# Patient Record
Sex: MALE | Race: BLACK OR AFRICAN AMERICAN | Employment: UNEMPLOYED | URBAN - METROPOLITAN AREA
[De-identification: names, ages, dates, MRNs, and addresses within clinical notes are randomized per-mention and may not be internally consistent; named-entity substitution may affect disease eponyms.]

---

## 2020-06-23 ENCOUNTER — HOSPITAL ENCOUNTER (EMERGENCY)
Age: 45
Discharge: HOME | End: 2020-06-23
Payer: MEDICAID

## 2020-06-23 ENCOUNTER — APPOINTMENT (OUTPATIENT)
Dept: GENERAL RADIOLOGY | Age: 45
End: 2020-06-23
Payer: MEDICAID

## 2020-06-23 VITALS
HEART RATE: 81 BPM | WEIGHT: 182.54 LBS | HEIGHT: 71 IN | RESPIRATION RATE: 17 BRPM | OXYGEN SATURATION: 100 % | DIASTOLIC BLOOD PRESSURE: 89 MMHG | BODY MASS INDEX: 25.56 KG/M2 | SYSTOLIC BLOOD PRESSURE: 142 MMHG | TEMPERATURE: 97.6 F

## 2020-06-23 PROCEDURE — 6370000000 HC RX 637 (ALT 250 FOR IP): Performed by: NURSE PRACTITIONER

## 2020-06-23 PROCEDURE — 72100 X-RAY EXAM L-S SPINE 2/3 VWS: CPT

## 2020-06-23 PROCEDURE — 99283 EMERGENCY DEPT VISIT LOW MDM: CPT

## 2020-06-23 RX ORDER — METHOCARBAMOL 750 MG/1
750 TABLET, FILM COATED ORAL 3 TIMES DAILY
Qty: 15 TABLET | Refills: 0 | Status: SHIPPED | OUTPATIENT
Start: 2020-06-23 | End: 2020-06-28

## 2020-06-23 RX ORDER — METHOCARBAMOL 750 MG/1
750 TABLET, FILM COATED ORAL ONCE
Status: COMPLETED | OUTPATIENT
Start: 2020-06-23 | End: 2020-06-23

## 2020-06-23 RX ORDER — NAPROXEN 250 MG/1
500 TABLET ORAL ONCE
Status: DISCONTINUED | OUTPATIENT
Start: 2020-06-23 | End: 2020-06-23 | Stop reason: HOSPADM

## 2020-06-23 RX ADMIN — METHOCARBAMOL 750 MG: 750 TABLET ORAL at 13:15

## 2020-06-23 ASSESSMENT — PAIN SCALES - GENERAL
PAINLEVEL_OUTOF10: 10
PAINLEVEL_OUTOF10: 8
PAINLEVEL_OUTOF10: 10
PAINLEVEL_OUTOF10: 10

## 2020-06-23 ASSESSMENT — PAIN DESCRIPTION - PROGRESSION
CLINICAL_PROGRESSION: NOT CHANGED
CLINICAL_PROGRESSION: NOT CHANGED

## 2020-06-23 ASSESSMENT — PAIN DESCRIPTION - ONSET: ONSET: ON-GOING

## 2020-06-23 ASSESSMENT — PAIN - FUNCTIONAL ASSESSMENT: PAIN_FUNCTIONAL_ASSESSMENT: PREVENTS OR INTERFERES SOME ACTIVE ACTIVITIES AND ADLS

## 2020-06-23 ASSESSMENT — PAIN DESCRIPTION - PAIN TYPE: TYPE: ACUTE PAIN;CHRONIC PAIN

## 2020-06-23 ASSESSMENT — PAIN DESCRIPTION - FREQUENCY: FREQUENCY: CONTINUOUS

## 2020-06-23 ASSESSMENT — PAIN DESCRIPTION - ORIENTATION: ORIENTATION: RIGHT;LEFT

## 2020-06-23 ASSESSMENT — PAIN DESCRIPTION - LOCATION: LOCATION: BACK;SHOULDER
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
## 2020-07-26 ENCOUNTER — HOSPITAL ENCOUNTER (EMERGENCY)
Age: 45
Discharge: HOME | End: 2020-07-26
Attending: EMERGENCY MEDICINE
Payer: MEDICAID

## 2020-07-26 ENCOUNTER — APPOINTMENT (OUTPATIENT)
Dept: GENERAL RADIOLOGY | Age: 45
End: 2020-07-26
Payer: MEDICAID

## 2020-07-26 VITALS
DIASTOLIC BLOOD PRESSURE: 70 MMHG | HEIGHT: 71 IN | SYSTOLIC BLOOD PRESSURE: 149 MMHG | TEMPERATURE: 98.9 F | RESPIRATION RATE: 16 BRPM | BODY MASS INDEX: 26.23 KG/M2 | OXYGEN SATURATION: 100 % | WEIGHT: 187.39 LBS | HEART RATE: 90 BPM

## 2020-07-26 LAB
A/G RATIO: 1.2 (ref 1.1–2.2)
ALBUMIN SERPL-MCNC: 3.5 G/DL (ref 3.4–5)
ALP BLD-CCNC: 71 U/L (ref 40–129)
ALT SERPL-CCNC: 18 U/L (ref 10–40)
ANION GAP SERPL CALCULATED.3IONS-SCNC: 10 MMOL/L (ref 3–16)
ANISOCYTOSIS: ABNORMAL
AST SERPL-CCNC: 16 U/L (ref 15–37)
BANDED NEUTROPHILS RELATIVE PERCENT: 1 % (ref 0–7)
BASOPHILS ABSOLUTE: 0.1 K/UL (ref 0–0.2)
BASOPHILS RELATIVE PERCENT: 1 %
BILIRUB SERPL-MCNC: <0.2 MG/DL (ref 0–1)
BUN BLDV-MCNC: 9 MG/DL (ref 7–20)
CALCIUM SERPL-MCNC: 8.6 MG/DL (ref 8.3–10.6)
CHLORIDE BLD-SCNC: 105 MMOL/L (ref 99–110)
CO2: 23 MMOL/L (ref 21–32)
CREAT SERPL-MCNC: 0.9 MG/DL (ref 0.9–1.3)
EOSINOPHILS ABSOLUTE: 0.1 K/UL (ref 0–0.6)
EOSINOPHILS RELATIVE PERCENT: 1 %
GFR AFRICAN AMERICAN: >60
GFR NON-AFRICAN AMERICAN: >60
GLOBULIN: 3 G/DL
GLUCOSE BLD-MCNC: 91 MG/DL (ref 70–99)
HCT VFR BLD CALC: 35 % (ref 40.5–52.5)
HEMATOLOGY PATH CONSULT: YES
HEMOGLOBIN: 11 G/DL (ref 13.5–17.5)
HYPOCHROMIA: ABNORMAL
LYMPHOCYTES ABSOLUTE: 1.5 K/UL (ref 1–5.1)
LYMPHOCYTES RELATIVE PERCENT: 26 %
MACROCYTES: ABNORMAL
MCH RBC QN AUTO: 20.5 PG (ref 26–34)
MCHC RBC AUTO-ENTMCNC: 31.4 G/DL (ref 31–36)
MCV RBC AUTO: 65.5 FL (ref 80–100)
MICROCYTES: ABNORMAL
MONOCYTES ABSOLUTE: 0.4 K/UL (ref 0–1.3)
MONOCYTES RELATIVE PERCENT: 7 %
NEUTROPHILS ABSOLUTE: 3.7 K/UL (ref 1.7–7.7)
NEUTROPHILS RELATIVE PERCENT: 64 %
PDW BLD-RTO: 18.6 % (ref 12.4–15.4)
PLATELET # BLD: 330 K/UL (ref 135–450)
PLATELET SLIDE REVIEW: ADEQUATE
PMV BLD AUTO: 8.4 FL (ref 5–10.5)
POLYCHROMASIA: ABNORMAL
POTASSIUM SERPL-SCNC: 4.1 MMOL/L (ref 3.5–5.1)
RBC # BLD: 5.35 M/UL (ref 4.2–5.9)
SLIDE REVIEW: ABNORMAL
SODIUM BLD-SCNC: 138 MMOL/L (ref 136–145)
TOTAL PROTEIN: 6.5 G/DL (ref 6.4–8.2)
WBC # BLD: 5.7 K/UL (ref 4–11)

## 2020-07-26 PROCEDURE — 72100 X-RAY EXAM L-S SPINE 2/3 VWS: CPT

## 2020-07-26 PROCEDURE — 6370000000 HC RX 637 (ALT 250 FOR IP): Performed by: EMERGENCY MEDICINE

## 2020-07-26 PROCEDURE — 99283 EMERGENCY DEPT VISIT LOW MDM: CPT

## 2020-07-26 PROCEDURE — 80053 COMPREHEN METABOLIC PANEL: CPT

## 2020-07-26 PROCEDURE — 72040 X-RAY EXAM NECK SPINE 2-3 VW: CPT

## 2020-07-26 PROCEDURE — 85025 COMPLETE CBC W/AUTO DIFF WBC: CPT

## 2020-07-26 RX ORDER — CYCLOBENZAPRINE HCL 10 MG
10 TABLET ORAL ONCE
Status: COMPLETED | OUTPATIENT
Start: 2020-07-26 | End: 2020-07-26

## 2020-07-26 RX ORDER — CYCLOBENZAPRINE HCL 10 MG
10 TABLET ORAL 3 TIMES DAILY PRN
Qty: 20 TABLET | Refills: 0 | Status: SHIPPED | OUTPATIENT
Start: 2020-07-26 | End: 2020-08-02

## 2020-07-26 RX ADMIN — CYCLOBENZAPRINE 10 MG: 10 TABLET, FILM COATED ORAL at 16:30

## 2020-07-26 ASSESSMENT — PAIN DESCRIPTION - PROGRESSION: CLINICAL_PROGRESSION: GRADUALLY WORSENING

## 2020-07-26 ASSESSMENT — PAIN DESCRIPTION - FREQUENCY: FREQUENCY: CONTINUOUS

## 2020-07-26 ASSESSMENT — PAIN DESCRIPTION - PAIN TYPE: TYPE: ACUTE PAIN;CHRONIC PAIN

## 2020-07-26 ASSESSMENT — PAIN DESCRIPTION - LOCATION: LOCATION: NECK;HAND

## 2020-07-26 ASSESSMENT — PAIN SCALES - GENERAL
PAINLEVEL_OUTOF10: 0
PAINLEVEL_OUTOF10: 9

## 2020-07-26 ASSESSMENT — PAIN - FUNCTIONAL ASSESSMENT: PAIN_FUNCTIONAL_ASSESSMENT: PREVENTS OR INTERFERES SOME ACTIVE ACTIVITIES AND ADLS

## 2020-07-26 ASSESSMENT — PAIN DESCRIPTION - ORIENTATION: ORIENTATION: RIGHT;LEFT

## 2020-07-26 ASSESSMENT — PAIN DESCRIPTION - DESCRIPTORS: DESCRIPTORS: ACHING;THROBBING

## 2020-07-26 NOTE — ED PROVIDER NOTES
Triage Chief Complaint:   Neck Pain (patient states neck surg 4-5 years ago.  having problems with recurrent right shoulder, bilateral hand and right leg pain. states just moved to the area, no PMD.  to ED for eval. )    MAU:  Janelle Lino is a 53 y.o. male that presents with request for referral to family doctor stating that he moved to the area and \"I need a doctor\". He has a variety of complaints all of which are chronic including neck pain which has been bothering him since he had surgery on it \"4 to 5 years ago\", along with bilateral chronic right leg pain and hand pain as well as low back pain. No change in urine no change in bowel no saddle anesthesia no recent trauma    ROS:  At least 12 systems reviewed and otherwise acutely negative except as in the [address] No past medical history on file. No past surgical history on file. No family history on file.   Social History     Socioeconomic History    Marital status: Single     Spouse name: Not on file    Number of children: Not on file    Years of education: Not on file    Highest education level: Not on file   Occupational History    Not on file   Social Needs    Financial resource strain: Not on file    Food insecurity     Worry: Not on file     Inability: Not on file    Transportation needs     Medical: Not on file     Non-medical: Not on file   Tobacco Use    Smoking status: Not on file   Substance and Sexual Activity    Alcohol use: Not on file    Drug use: Not on file    Sexual activity: Not on file   Lifestyle    Physical activity     Days per week: Not on file     Minutes per session: Not on file    Stress: Not on file   Relationships    Social connections     Talks on phone: Not on file     Gets together: Not on file     Attends Shinto service: Not on file     Active member of club or organization: Not on file     Attends meetings of clubs or organizations: Not on file     Relationship status: Not on file    Intimate partner violence     Fear of current or ex partner: Not on file     Emotionally abused: Not on file     Physically abused: Not on file     Forced sexual activity: Not on file   Other Topics Concern    Not on file   Social History Narrative    Not on file     Current Facility-Administered Medications   Medication Dose Route Frequency Provider Last Rate Last Dose    cyclobenzaprine (FLEXERIL) tablet 10 mg  10 mg Oral Once Maral Kruse MD         Current Outpatient Medications   Medication Sig Dispense Refill    diclofenac (VOLTAREN) 50 MG EC tablet Take 1 tablet by mouth 2 times daily 30 tablet 0     No Known Allergies    [unfilled]    Nursing Notes Reviewed    Physical Exam:  Vitals:    07/26/20 1441   BP: (!) 149/70   Pulse: 90   Resp: 16   Temp: 98.9 °F (37.2 °C)   SpO2: 100%       Constitutional:  Well developed, well nourished, no acute distress  HENT:  Atraumatic,  Moist mucus membranes  Neck: No JVD, supple   Respiratory:  No respiratory distress, normal breath sounds  Cardiovascular:  regular rate,  no murmurs  GI:  Soft, nontender, no pulsatile masses or bruits of the abdomen  Musculoskeletal:  No edema, no acute deformities    Back: no midline tenderness to palpation, no paraspinous tenderness to palpation, + straight leg raise on the b/l wnl  Integument:  Well hydrated   Vascular: Dorsalis pedis pulses are 2+ equal bilaterally  Neurologic:  Motor testing reveals: intact thigh adduction, knee flexion and extension, ankle dorsiflexion, toe plantarflexion, and great toe dorsiflexion bilaterally, patellar reflexes are 2+ and equal bilaterally, sensation to light touch is intact in the groin and lower extremities bilaterally    I have reviewed and interpreted all of the currently available lab results from this visit (if applicable):  No results found for this visit on 07/26/20.      Radiographs (if obtained):  [] The following radiograph was interpreted by myself in the absence of a radiologist:  [x] Radiologist's Report Reviewed:     XR CERVICAL SPINE (2-3 VIEWS) (Final result)   Result time 07/26/20 16:18:51   Final result by Miguel Angel Kilpatrick MD (07/26/20 16:18:51)                 Impression:     No evidence of acute abnormality in the cervical spine. Stable benign appearing sclerosis involving the right sacroiliac joint which   is of uncertain etiology.  Consider CT of the pelvis for further evaluation. Narrative:     EXAMINATION:   THREE XRAY VIEWS OF THE LUMBAR SPINE; 3 XRAY VIEWS OF THE CERVICAL SPINE     7/26/2020 3:59 pm     COMPARISON:   X-ray lumbar spine 06/23/2020     HISTORY:   ORDERING SYSTEM PROVIDED HISTORY: low back pain   TECHNOLOGIST PROVIDED HISTORY:   Reason for exam:->low back pain   Reason for Exam: low back pain   Acuity: Acute   Type of Exam: Initial     FINDINGS:   Cervical spine: No evidence of acute fracture or dislocation of the cervical   spine.  Previous anterior fusion at C4-C5.  Anterior osteophytes are seen in   the cervical spine. Lumbar spine: No evidence of acute fracture or dislocation of the lumbar   spine.  Mild facet arthropathy in the lower lumbar spine.  Sclerosis   involving the right sacroiliac joint appears unchanged compared to 06/23/2020.                       XR LUMBAR SPINE (2-3 VIEWS) (Final result)   Result time 07/26/20 16:18:51   Final result by Miguel Angel Kilpatrick MD (07/26/20 16:18:51)                 Impression:     No evidence of acute abnormality in the cervical spine. Stable benign appearing sclerosis involving the right sacroiliac joint which   is of uncertain etiology.  Consider CT of the pelvis for further evaluation.              Narrative:     EXAMINATION:   THREE XRAY VIEWS OF THE LUMBAR SPINE; 3 XRAY VIEWS OF THE CERVICAL SPINE     7/26/2020 3:59 pm     COMPARISON:   X-ray lumbar spine 06/23/2020     HISTORY:   ORDERING SYSTEM PROVIDED HISTORY: low back pain   TECHNOLOGIST PROVIDED HISTORY:   Reason for exam:->low back pain Reason for Exam: low back pain   Acuity: Acute   Type of Exam: Initial     FINDINGS:   Cervical spine: No evidence of acute fracture or dislocation of the cervical   spine.  Previous anterior fusion at C4-C5.  Anterior osteophytes are seen in   the cervical spine. Lumbar spine: No evidence of acute fracture or dislocation of the lumbar   spine.  Mild facet arthropathy in the lower lumbar spine.  Sclerosis   involving the right sacroiliac joint appears unchanged compared to 06/23/2020. EKG (if obtained): (All EKG's are interpreted by myself in the absence of a cardiologist)  Initial EKG on my interpretation shows n/a    MDM:  Differential diagnosis: Cauda equina syndrome, renal failure, hypoglycemia, chronic neck pain    The patient's complaints are all rather chronic. I will do basic labs and image the cervical spine and the lumbar spine as physical exam is reassuring but more challenging on those areas. The leg is completely unremarkable as is examination of both hands and therefore I will not image them as this seems to be a chronic issue with no objective findings consistent with a traumatic process. Pt denies any history of new numbness, weakness, incontinence of bowel or bladder, constipation, saddle anesthesia or paresthesias. I estimate there is LOW risk for ABDOMINAL AORTIC ANEURYSM, CAUDA EQUINA SYNDROME, EPIDURAL MASS LESION, OR CORD COMPRESSION. The patient's H&H shows mild anemia w H/H of 11/35 but we have no priors for comparison so he was made aware of such. Referred to hematology. chemistry unremarkable. I clearly have explained today's imaging does not rule out missed/occult fractures, nor ligamentous and/or tendonous injury and therefore patient must follow-up with PCP/[facility]/Ortho as referred for re-evaluation and possible advanced and/or repeat imaging. Discussed results, diagnosis and plan with patient and/or family. Questions addressed.  Disposition and follow-up agreed upon. Specific discharge instructions explained. The patient and/or family and I have discussed the diagnosis and risks, and we agree with discharging home to follow-up with their primary care, specialist or referral doctor. In the event that medications were prescribed the risk profile of these medications were detailed expressly. We also discussed returning to the Emergency Department immediately if new or worsening symptoms occur. We have discussed the symptoms which are most concerning that necessitate immediate return. Urgent referral for PCP also placed. Given work note per his request and Flexeril prescription. Old records reviewed. Labs and imaging reviewed and results discussed with patient        Patient was given scripts for the following medications. I counseled patient how to take these medications. New Prescriptions    No medications on file         CRITICAL CARE TIME   Total Critical Care time was 0 minutes, excluding separately reportable procedures. There was a high probability of clinically significant/life threatening deterioration in the patient's condition which required my urgent intervention.       Clinical Impression:  Chronic pain, anemia, request for PCP referral  (Please note that portions of this note may have been completed with a voice recognition program. Efforts were made to edit the dictations but occasionally words are mis-transcribed.)    MD Susie Nichols MD  07/26/20 7909

## 2020-07-27 LAB — HEMATOLOGY PATH CONSULT: NORMAL

## 2020-10-03 ENCOUNTER — APPOINTMENT (OUTPATIENT)
Dept: CT IMAGING | Age: 45
End: 2020-10-03
Payer: COMMERCIAL

## 2020-10-03 ENCOUNTER — HOSPITAL ENCOUNTER (EMERGENCY)
Age: 45
Discharge: HOME | End: 2020-10-03
Attending: EMERGENCY MEDICINE
Payer: COMMERCIAL

## 2020-10-03 VITALS
WEIGHT: 195.99 LBS | OXYGEN SATURATION: 98 % | HEART RATE: 88 BPM | SYSTOLIC BLOOD PRESSURE: 125 MMHG | TEMPERATURE: 98.5 F | BODY MASS INDEX: 27.44 KG/M2 | HEIGHT: 71 IN | DIASTOLIC BLOOD PRESSURE: 68 MMHG

## 2020-10-03 PROCEDURE — 72192 CT PELVIS W/O DYE: CPT

## 2020-10-03 PROCEDURE — 99283 EMERGENCY DEPT VISIT LOW MDM: CPT

## 2020-10-03 PROCEDURE — 6360000002 HC RX W HCPCS: Performed by: EMERGENCY MEDICINE

## 2020-10-03 PROCEDURE — 96372 THER/PROPH/DIAG INJ SC/IM: CPT

## 2020-10-03 PROCEDURE — 72131 CT LUMBAR SPINE W/O DYE: CPT

## 2020-10-03 RX ORDER — ORPHENADRINE CITRATE 30 MG/ML
60 INJECTION INTRAMUSCULAR; INTRAVENOUS ONCE
Status: COMPLETED | OUTPATIENT
Start: 2020-10-03 | End: 2020-10-03

## 2020-10-03 RX ORDER — METHOCARBAMOL 500 MG/1
500 TABLET, FILM COATED ORAL 4 TIMES DAILY
Qty: 40 TABLET | Refills: 0 | Status: SHIPPED | OUTPATIENT
Start: 2020-10-03 | End: 2020-10-13

## 2020-10-03 RX ORDER — NAPROXEN 500 MG/1
500 TABLET ORAL 2 TIMES DAILY
Qty: 20 TABLET | Refills: 0 | Status: SHIPPED | OUTPATIENT
Start: 2020-10-03 | End: 2021-08-27 | Stop reason: SDUPTHER

## 2020-10-03 RX ORDER — KETOROLAC TROMETHAMINE 30 MG/ML
30 INJECTION, SOLUTION INTRAMUSCULAR; INTRAVENOUS ONCE
Status: COMPLETED | OUTPATIENT
Start: 2020-10-03 | End: 2020-10-03

## 2020-10-03 RX ADMIN — KETOROLAC TROMETHAMINE 30 MG: 30 INJECTION, SOLUTION INTRAMUSCULAR at 11:57

## 2020-10-03 RX ADMIN — ORPHENADRINE CITRATE 60 MG: 30 INJECTION INTRAMUSCULAR; INTRAVENOUS at 11:57

## 2020-10-03 SDOH — HEALTH STABILITY: MENTAL HEALTH: HOW OFTEN DO YOU HAVE A DRINK CONTAINING ALCOHOL?: NEVER

## 2020-10-03 ASSESSMENT — PAIN DESCRIPTION - FREQUENCY
FREQUENCY: CONTINUOUS
FREQUENCY: CONTINUOUS

## 2020-10-03 ASSESSMENT — PAIN SCALES - GENERAL
PAINLEVEL_OUTOF10: 10
PAINLEVEL_OUTOF10: 7

## 2020-10-03 ASSESSMENT — PAIN DESCRIPTION - ORIENTATION
ORIENTATION: RIGHT
ORIENTATION: RIGHT

## 2020-10-03 ASSESSMENT — PAIN DESCRIPTION - LOCATION
LOCATION: BACK
LOCATION: BACK

## 2020-10-03 ASSESSMENT — PAIN DESCRIPTION - DESCRIPTORS
DESCRIPTORS: SHARP
DESCRIPTORS: SHARP

## 2020-10-03 ASSESSMENT — PAIN DESCRIPTION - PAIN TYPE
TYPE: ACUTE PAIN
TYPE: ACUTE PAIN

## 2020-10-03 NOTE — ED PROVIDER NOTES
12:21 PM      CONTINUE these medications which have NOT CHANGED    Details   diclofenac (VOLTAREN) 50 MG EC tablet Take 1 tablet by mouth 2 times daily, Disp-30 tablet, R-0Print             ALLERGIES     Patient has no known allergies. FAMILY HISTORY     History reviewed. No pertinent family history. SOCIAL HISTORY       Social History     Socioeconomic History    Marital status: Single     Spouse name: None    Number of children: None    Years of education: None    Highest education level: None   Occupational History    None   Social Needs    Financial resource strain: None    Food insecurity     Worry: None     Inability: None    Transportation needs     Medical: None     Non-medical: None   Tobacco Use    Smoking status: Current Every Day Smoker     Packs/day: 1.00     Types: Cigarettes    Smokeless tobacco: Never Used   Substance and Sexual Activity    Alcohol use: Never     Frequency: Never    Drug use: Never    Sexual activity: None   Lifestyle    Physical activity     Days per week: None     Minutes per session: None    Stress: None   Relationships    Social connections     Talks on phone: None     Gets together: None     Attends Baptism service: None     Active member of club or organization: None     Attends meetings of clubs or organizations: None     Relationship status: None    Intimate partner violence     Fear of current or ex partner: None     Emotionally abused: None     Physically abused: None     Forced sexual activity: None   Other Topics Concern    None   Social History Narrative    None       SCREENINGS           PHYSICAL EXAM    (up to 7 for level 4, 8 or more for level 5)     ED Triage Vitals   BP Temp Temp src Pulse Resp SpO2 Height Weight   -- -- -- -- -- -- -- --       Physical Exam    General: Alert and awake ×3. Nontoxic appearance. Well-developed well-nourished 23-year-old black male in no acute distress. HEENT: Normocephalic atraumatic. Neck is supple. Airway intact. No adenopathy  Cardiac: Regular rate and rhythm with no murmurs rubs or gallops  Pulmonary: Lungs are clear in all lung fields. No wheezing. No Rales. Abdomen: Soft and nontender. Negative hepatosplenomegaly. Bowel sounds are active  Extremities: Moving all extremities. No calf tenderness. Peripheral pulses all intact. Negative straight leg raises. On palpation of the lower spine he does have some tenderness in the paraspinal muscle area. Some nodular nodes noted. There is no skin rash. No redness. There is no step-off. Skin: No skin lesions. No rashes  Neurologic: Cranial nerves II through XII was grossly intact. Nonfocal neurological exam  Psychiatric: Patient is pleasant. Mood is appropriate. DIAGNOSTIC RESULTS     EKG (Per Emergency Physician):       RADIOLOGY (Per Emergency Physician): Interpretation per the Radiologist below, if available at the time of this note:  Ct Lumbar Spine Wo Contrast    Result Date: 10/3/2020  EXAMINATION: CT OF THE LUMBAR SPINE WITHOUT CONTRAST  10/3/2020 TECHNIQUE: CT of the lumbar spine was performed without the administration of intravenous contrast. Multiplanar reformatted images are provided for review. Dose modulation, iterative reconstruction, and/or weight based adjustment of the mA/kV was utilized to reduce the radiation dose to as low as reasonably achievable. COMPARISON: None HISTORY: ORDERING SYSTEM PROVIDED HISTORY: BACK PAIN TECHNOLOGIST PROVIDED HISTORY: Reason for exam:->Back pain FINDINGS: BONES/ALIGNMENT: Alignment is within normal limits. No acute fracture. DEGENERATIVE CHANGES: Vacuum disc at L4-L5 with prominent Schmorl's nodes along the inferior endplate of L4 and superior endplate of L5. Mild ligamentum flavum hypertrophy at L2-L3 and L3-L4. Mild posterior disc bulge at L3-L4. Mild narrowing of the central canal at these levels.   Disc bulge at L4-L5 with mild narrowing of the central canal.  Mild narrowing of the neural foraminal bilaterally due to disc bulge. Mild disc bulge at L5-S1. No high-grade central canal or neural foraminal stenosis. SOFT TISSUES/RETROPERITONEUM: Punctate stone in the mid to upper right kidney. No hydronephrosis. No acute fracture. Degenerative changes as above. Punctate nonobstructing stone in the right kidney. Ct Pelvis Wo Contrast Additional Contrast? None    Result Date: 10/3/2020  EXAMINATION: CT OF THE PELVIS WITHOUT CONTRAST 10/3/2020 11:17 am TECHNIQUE: CT of the pelvis was performed without the administration of intravenous contrast.  Multiplanar reformatted images are provided for review. Dose modulation, iterative reconstruction, and/or weight based adjustment of the mA/kV was utilized to reduce the radiation dose to as low as reasonably achievable. COMPARISON: None. HISTORY: ORDERING SYSTEM PROVIDED HISTORY: low back pain TECHNOLOGIST PROVIDED HISTORY: Reason for exam:->low back pain Additional Contrast?->None Reason for Exam: back pain FINDINGS: PELVIC NODES: No adenopathy. PELVIC ORGANS: The bladder is unremarkable. The prostate is normal in size. PERITONEUM/MESENTERY/BOWEL: The visualized bowel loops are normal in caliber. There is no definite bowel wall thickening. There is no ascites. BONES/SOFT TISSUES: No acute osseous or soft tissue abnormality. There is a fat containing right inguinal hernia. There is a fat containing umbilical hernia. Unremarkable CT exam of the pelvis. ED BEDSIDE ULTRASOUND:   Performed by ED Physician - none    LABS:  Labs Reviewed - No data to display     All other labs were within normal range or not returned as of this dictation.       Procedures      EMERGENCY DEPARTMENT COURSE and DIFFERENTIAL DIAGNOSIS/MDM:   Vitals:    Vitals:    10/03/20 1101 10/03/20 1201   BP: 130/86 125/68   Pulse: 88    Temp: 98.5 °F (36.9 °C)    TempSrc: Oral    SpO2: 98%    Weight: 195 lb 15.8 oz (88.9 kg)    Height: 5' 11\" (1.803 m)        Medications ketorolac (TORADOL) injection 30 mg (30 mg Intramuscular Given 10/3/20 1157)   orphenadrine (NORFLEX) injection 60 mg (60 mg Intramuscular Given 10/3/20 1157)       MDM. 68-year-old male with low back pain. Patient has history of back issues. CT scan was obtained which shows some degenerative changes nothing acute. There is no herniation of the disc. Patient treated with Toradol and Norflex in the department and will be placed on Naprosyn and Robaxin. Recommend follow-up with family physician. I do not see anything acute at this time probably exacerbation of a chronic pain. Patient discharged and reassured. May return if continues have same issue. REVAL:         CRITICAL CARE TIME   Total CriticalCare time was 0 minutes, excluding separately reportable procedures. There was a high probability of clinically significant/life threatening deterioration in the patient's condition which required my urgent intervention. CONSULTS:  None    PROCEDURES:  Unless otherwise noted below, none     [unfilled]    FINAL IMPRESSION      1. Acute exacerbation of chronic low back pain          DISPOSITION/PLAN   DISPOSITION        PATIENT REFERRED TO:  Family physician    Schedule an appointment as soon as possible for a visit in 1 week  If symptoms worsen      DISCHARGE MEDICATIONS:  Discharge Medication List as of 10/3/2020 12:21 PM      START taking these medications    Details   naproxen (NAPROSYN) 500 MG tablet Take 1 tablet by mouth 2 times daily for 20 doses, Disp-20 tablet,R-0Print      methocarbamol (ROBAXIN) 500 MG tablet Take 1 tablet by mouth 4 times daily for 10 days, Disp-40 tablet,R-0Print                (Please note:  Portions of this note were completed with a voice recognition program.Efforts were made to edit the dictations but occasionally words and phrases are mis-transcribed.)  Form v2016. J.[id number]    CAMILA PARIS MD (electronically signed)  Emergency Medicine Provider        Robin Covarrubias, MD  10/03/20 1241

## 2020-10-07 ENCOUNTER — OFFICE VISIT (OUTPATIENT)
Dept: INTERNAL MEDICINE CLINIC | Age: 45
End: 2020-10-07
Payer: COMMERCIAL

## 2020-10-07 VITALS
DIASTOLIC BLOOD PRESSURE: 86 MMHG | HEART RATE: 92 BPM | OXYGEN SATURATION: 100 % | HEIGHT: 71 IN | SYSTOLIC BLOOD PRESSURE: 129 MMHG | BODY MASS INDEX: 28.14 KG/M2 | WEIGHT: 201 LBS | TEMPERATURE: 97.3 F | RESPIRATION RATE: 16 BRPM

## 2020-10-07 PROCEDURE — 99213 OFFICE O/P EST LOW 20 MIN: CPT | Performed by: STUDENT IN AN ORGANIZED HEALTH CARE EDUCATION/TRAINING PROGRAM

## 2020-10-07 PROCEDURE — 93005 ELECTROCARDIOGRAM TRACING: CPT | Performed by: STUDENT IN AN ORGANIZED HEALTH CARE EDUCATION/TRAINING PROGRAM

## 2020-10-07 RX ORDER — NICOTINE 21 MG/24HR
1 PATCH, TRANSDERMAL 24 HOURS TRANSDERMAL DAILY
Qty: 42 PATCH | Refills: 0 | Status: SHIPPED | OUTPATIENT
Start: 2020-10-07 | End: 2021-05-21 | Stop reason: SDUPTHER
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
## 2020-11-30 ENCOUNTER — OFFICE VISIT (OUTPATIENT)
Dept: INTERNAL MEDICINE CLINIC | Age: 45
End: 2020-11-30
Payer: COMMERCIAL

## 2020-11-30 VITALS
SYSTOLIC BLOOD PRESSURE: 156 MMHG | BODY MASS INDEX: 29.01 KG/M2 | RESPIRATION RATE: 16 BRPM | HEART RATE: 98 BPM | OXYGEN SATURATION: 97 % | WEIGHT: 208 LBS | TEMPERATURE: 98.4 F | DIASTOLIC BLOOD PRESSURE: 93 MMHG

## 2020-11-30 PROCEDURE — 99213 OFFICE O/P EST LOW 20 MIN: CPT | Performed by: STUDENT IN AN ORGANIZED HEALTH CARE EDUCATION/TRAINING PROGRAM

## 2020-11-30 ASSESSMENT — ENCOUNTER SYMPTOMS
APNEA: 0
EYES NEGATIVE: 1
CHEST TIGHTNESS: 0
BACK PAIN: 1
ABDOMINAL DISTENTION: 0
COUGH: 0
STRIDOR: 0
GASTROINTESTINAL NEGATIVE: 1
SHORTNESS OF BREATH: 0

## 2020-11-30 ASSESSMENT — PATIENT HEALTH QUESTIONNAIRE - PHQ9
1. LITTLE INTEREST OR PLEASURE IN DOING THINGS: 0
SUM OF ALL RESPONSES TO PHQ QUESTIONS 1-9: 0
SUM OF ALL RESPONSES TO PHQ9 QUESTIONS 1 & 2: 0
2. FEELING DOWN, DEPRESSED OR HOPELESS: 0
SUM OF ALL RESPONSES TO PHQ QUESTIONS 1-9: 0
SUM OF ALL RESPONSES TO PHQ QUESTIONS 1-9: 0

## 2020-11-30 NOTE — PROGRESS NOTES
11/30/2020         HPI  The patient is a 53 y.o. male from [facility] who established care with us in October. Pt had cervical procedure C4-5 fusion done in [place] several years ago. Patient had complained of neck pain and radicular pain on his last visit which he reported started after his surgery years ago. He also complained of IBRAHIM and orthopnea (5 pillow) started 7-8 months ago. ECHO and stress test was ordered however he did not get it done. Presenting today asking for UC pain clinic referral.      Smokes 1 PPD, marijuana for several years     Denied Ab pain, n/v, fc, ha    Review of Systems   Constitutional: Negative for activity change and appetite change. HENT: Negative. Eyes: Negative. Respiratory: Negative for apnea, cough, chest tightness, shortness of breath and stridor. Cardiovascular: Negative for chest pain, palpitations and leg swelling. Gastrointestinal: Negative. Negative for abdominal distention. Endocrine: Negative. Genitourinary: Negative. Musculoskeletal: Positive for back pain. Negative for gait problem, joint swelling and myalgias. Prior to Visit Medications    Medication Sig Taking?  Authorizing Provider   diclofenac (VOLTAREN) 50 MG EC tablet Take 1 tablet by mouth 2 times daily Yes RU Gutierrez - CNP   nicotine (NICODERM CQ) 14 MG/24HR Place 1 patch onto the skin daily  Radha Daniels MD   naproxen (NAPROSYN) 500 MG tablet Take 1 tablet by mouth 2 times daily for 20 doses  Monster Ye MD        Social History     Tobacco Use    Smoking status: Current Every Day Smoker     Packs/day: 1.00     Types: Cigarettes    Smokeless tobacco: Never Used   Substance Use Topics    Alcohol use: Never     Frequency: Never        Vitals:    11/30/20 1322   BP: (!) 156/93   Site: Right Upper Arm   Position: Sitting   Cuff Size: Large Adult   Pulse: 98   Resp: 16   Temp: 98.4 °F (36.9 °C)   TempSrc: Oral   SpO2: 97%   Weight: 208 lb (94.3 kg)     Estimated body mass index is 29.01 kg/m² as calculated from the following:    Height as of 10/7/20: 5' 11\" (1.803 m). Weight as of this encounter: 208 lb (94.3 kg). Physical Exam  HENT:      Head: Normocephalic. Mouth/Throat:      Mouth: Mucous membranes are moist.   Eyes:      Pupils: Pupils are equal, round, and reactive to light. Neck:      Musculoskeletal: Normal range of motion. Cardiovascular:      Rate and Rhythm: Normal rate and regular rhythm. Pulses: Normal pulses. Heart sounds: Normal heart sounds. Pulmonary:      Effort: Pulmonary effort is normal. No respiratory distress. Breath sounds: Normal breath sounds. Abdominal:      General: Abdomen is flat. Skin:     General: Skin is warm. Neurological:      General: No focal deficit present. Mental Status: He is alert and oriented to person, place, and time. Psychiatric:         Mood and Affect: Mood normal.         ASSESSMENT/PLAN:    Neck pain w/ radicular pain: 2/2 C4-5 fusion  - Manage w/ Robaxin and lidoderm patch has not helped. Pt requested UC pain management referral         ISABELLA:   - iron studies  - repeat CBC     Tobacco use  - Nicotine patch  - advised harmful effects     Orthopnea  - EKG  - ECHO  - Stress test    Healthcare maint:  - PSA, HIV, HEP C    Return in about 6 months (around 5/30/2021). An electronic signature was used to authenticate this note.     --Sanjiv Morley MD on 11/30/2020 at 1:47 PM

## 2021-05-21 ENCOUNTER — OFFICE VISIT (OUTPATIENT)
Dept: INTERNAL MEDICINE CLINIC | Age: 46
End: 2021-05-21
Payer: COMMERCIAL

## 2021-05-21 VITALS
SYSTOLIC BLOOD PRESSURE: 137 MMHG | WEIGHT: 204 LBS | HEIGHT: 71 IN | OXYGEN SATURATION: 100 % | DIASTOLIC BLOOD PRESSURE: 91 MMHG | BODY MASS INDEX: 28.56 KG/M2 | TEMPERATURE: 97.3 F | HEART RATE: 94 BPM

## 2021-05-21 DIAGNOSIS — Z72.0 TOBACCO ABUSE: ICD-10-CM

## 2021-05-21 DIAGNOSIS — R03.0 ELEVATED BLOOD PRESSURE READING: ICD-10-CM

## 2021-05-21 DIAGNOSIS — G62.9 NEUROPATHY: Primary | ICD-10-CM

## 2021-05-21 PROCEDURE — 99213 OFFICE O/P EST LOW 20 MIN: CPT | Performed by: STUDENT IN AN ORGANIZED HEALTH CARE EDUCATION/TRAINING PROGRAM

## 2021-05-21 RX ORDER — CAPSAICIN 0.025 %
CREAM (GRAM) TOPICAL
Qty: 2 TUBE | Refills: 2 | Status: SHIPPED | OUTPATIENT
Start: 2021-05-21 | End: 2021-06-20

## 2021-05-21 RX ORDER — NICOTINE 21 MG/24HR
1 PATCH, TRANSDERMAL 24 HOURS TRANSDERMAL DAILY
Qty: 42 PATCH | Refills: 0 | Status: SHIPPED | OUTPATIENT
Start: 2021-05-21 | End: 2021-08-27 | Stop reason: SDUPTHER

## 2021-05-21 ASSESSMENT — PATIENT HEALTH QUESTIONNAIRE - PHQ9
SUM OF ALL RESPONSES TO PHQ QUESTIONS 1-9: 0
SUM OF ALL RESPONSES TO PHQ QUESTIONS 1-9: 0
2. FEELING DOWN, DEPRESSED OR HOPELESS: 0

## 2021-05-21 ASSESSMENT — VISUAL ACUITY: OU: 1

## 2021-05-21 NOTE — PATIENT INSTRUCTIONS
- All you meds have been refilled. Take as prescribed  - Go and get your blood work done  - Try to quit smoking. Call and let us know if there is anything we can heller to help. - See some pt instructions below    Follow up in 6months. Patient Education        DASH Diet: Care Instructions  Your Care Instructions     The DASH diet is an eating plan that can help lower your blood pressure. DASH stands for Dietary Approaches to Stop Hypertension. Hypertension is high blood pressure. The DASH diet focuses on eating foods that are high in calcium, potassium, and magnesium. These nutrients can lower blood pressure. The foods that are highest in these nutrients are fruits, vegetables, low-fat dairy products, nuts, seeds, and legumes. But taking calcium, potassium, and magnesium supplements instead of eating foods that are high in those nutrients does not have the same effect. The DASH diet also includes whole grains, fish, and poultry. The DASH diet is one of several lifestyle changes your doctor may recommend to lower your high blood pressure. Your doctor may also want you to decrease the amount of sodium in your diet. Lowering sodium while following the DASH diet can lower blood pressure even further than just the DASH diet alone. Follow-up care is a key part of your treatment and safety. Be sure to make and go to all appointments, and call your doctor if you are having problems. It's also a good idea to know your test results and keep a list of the medicines you take. How can you care for yourself at home? Following the DASH diet  · Eat 4 to 5 servings of fruit each day. A serving is 1 medium-sized piece of fruit, ½ cup chopped or canned fruit, 1/4 cup dried fruit, or 4 ounces (½ cup) of fruit juice. Choose fruit more often than fruit juice. · Eat 4 to 5 servings of vegetables each day.  A serving is 1 cup of lettuce or raw leafy vegetables, ½ cup of chopped or cooked vegetables, or 4 ounces (½ cup) of vegetable juice. Choose vegetables more often than vegetable juice. · Get 2 to 3 servings of low-fat and fat-free dairy each day. A serving is 8 ounces of milk, 1 cup of yogurt, or 1 ½ ounces of cheese. · Eat 6 to 8 servings of grains each day. A serving is 1 slice of bread, 1 ounce of dry cereal, or ½ cup of cooked rice, pasta, or cooked cereal. Try to choose whole-grain products as much as possible. · Limit lean meat, poultry, and fish to 2 servings each day. A serving is 3 ounces, about the size of a deck of cards. · Eat 4 to 5 servings of nuts, seeds, and legumes (cooked dried beans, lentils, and split peas) each week. A serving is 1/3 cup of nuts, 2 tablespoons of seeds, or ½ cup of cooked beans or peas. · Limit fats and oils to 2 to 3 servings each day. A serving is 1 teaspoon of vegetable oil or 2 tablespoons of salad dressing. · Limit sweets and added sugars to 5 servings or less a week. A serving is 1 tablespoon jelly or jam, ½ cup sorbet, or 1 cup of lemonade. · Eat less than 2,300 milligrams (mg) of sodium a day. If you limit your sodium to 1,500 mg a day, you can lower your blood pressure even more. · Be aware that all of these are the suggested number of servings for people who eat 1,800 to 2,000 calories a day. Your recommended number of servings may be different if you need more or fewer calories. Tips for success  · Start small. Do not try to make dramatic changes to your diet all at once. You might feel that you are missing out on your favorite foods and then be more likely to not follow the plan. Make small changes, and stick with them. Once those changes become habit, add a few more changes. · Try some of the following:  ? Make it a goal to eat a fruit or vegetable at every meal and at snacks. This will make it easy to get the recommended amount of fruits and vegetables each day. ? Try yogurt topped with fruit and nuts for a snack or healthy dessert.   ? Add lettuce, tomato, cucumber, and onion to sandwiches. ? Combine a ready-made pizza crust with low-fat mozzarella cheese and lots of vegetable toppings. Try using tomatoes, squash, spinach, broccoli, carrots, cauliflower, and onions. ? Have a variety of cut-up vegetables with a low-fat dip as an appetizer instead of chips and dip. ? Sprinkle sunflower seeds or chopped almonds over salads. Or try adding chopped walnuts or almonds to cooked vegetables. ? Try some vegetarian meals using beans and peas. Add garbanzo or kidney beans to salads. Make burritos and tacos with mashed jackson beans or black beans. Where can you learn more? Go to https://tenKsolarvinhHOMETRAX.Minuteman Global. org and sign in to your [employer] account. Enter W744 in the [facility] box to learn more about \"DASH Diet: Care Instructions. \"     If you do not have an account, please click on the \"Sign Up Now\" link. Current as of: August 31, 2020               Content Version: 12.8  © 3200-8078 [employer]. Care instructions adapted under license by [facility] ([facility]). If you have questions about a medical condition or this instruction, always ask your healthcare professional. [address] any warranty or liability for your use of this information. Stopping Smoking: Care Instructions  Your Care Instructions     Cigarette smokers crave the nicotine in cigarettes. Giving it up is much harder than simply changing a habit. Your body has to stop craving the nicotine. It is hard to quit, but you can do it. There are many tools that people use to quit smoking. You may find that combining tools works best for you. There are several steps to quitting. First you get ready to quit. Then you get support to help you. After that, you learn new skills and behaviors to become a nonsmoker. For many people, a necessary step is getting and using medicine. Your doctor will help you set up the plan that best meets your needs.  You may want to attend a smoking cessation program to help you quit smoking. When you choose a program, look for one that has proven success. Ask your doctor for ideas. You will greatly increase your chances of success if you take medicine as well as get counseling or join a cessation program.  Some of the changes you feel when you first quit tobacco are uncomfortable. Your body will miss the nicotine at first, and you may feel short-tempered and grumpy. You may have trouble sleeping or concentrating. Medicine can help you deal with these symptoms. You may struggle with changing your smoking habits and rituals. The last step is the tricky one: Be prepared for the smoking urge to continue for a time. This is a lot to deal with, but keep at it. You will feel better. Follow-up care is a key part of your treatment and safety. Be sure to make and go to all appointments, and call your doctor if you are having problems. It's also a good idea to know your test results and keep a list of the medicines you take. How can you care for yourself at home? · Ask your family, friends, and coworkers for support. You have a better chance of quitting if you have help and support. · Join a support group, such as Nicotine Anonymous, for people who are trying to quit smoking. · Consider signing up for a smoking cessation program, such as the American Lung Association's Freedom from Smoking program.  · Get text messaging support. Go to the website at www.smokefree. gov to sign up for the [facility] program.  · Set a quit date. Pick your date carefully so that it is not right in the middle of a big deadline or stressful time. Once you quit, do not even take a puff. Get rid of all ashtrays and lighters after your last cigarette. Clean your house and your clothes so that they do not smell of smoke. · Learn how to be a nonsmoker. Think about ways you can avoid those things that make you reach for a cigarette. ? Avoid situations that put you at greatest risk for smoking.  For some people, it is hard to have a drink with friends without smoking. For others, they might skip a coffee break with coworkers who smoke. ? Change your daily routine. Take a different route to work or eat a meal in a different place. · Cut down on stress. Calm yourself or release tension by doing an activity you enjoy, such as reading a book, taking a hot bath, or gardening. · Talk to your doctor or pharmacist about nicotine replacement therapy, which replaces the nicotine in your body. You still get nicotine but you do not use tobacco. Nicotine replacement products help you slowly reduce the amount of nicotine you need. These products come in several forms, many of them available over-the-counter:  ? Nicotine patches  ? Nicotine gum and lozenges  ? Nicotine inhaler  · Ask your doctor about bupropion (Wellbutrin) or varenicline (Chantix), which are prescription medicines. They do not contain nicotine. They help you by reducing withdrawal symptoms, such as stress and anxiety. · Some people find hypnosis, acupuncture, and massage helpful for ending the smoking habit. · Eat a healthy diet and get regular exercise. Having healthy habits will help your body move past its craving for nicotine. · Be prepared to keep trying. Most people are not successful the first few times they try to quit. Do not get mad at yourself if you smoke again. Make a list of things you learned and think about when you want to try again, such as next week, next month, or next year. Where can you learn more? Go to https://DataCrowdrajSigniant.Akita. org and sign in to your [employer] account. Enter B095 in the [facility] box to learn more about \"Stopping Smoking: Care Instructions. \"     If you do not have an account, please click on the \"Sign Up Now\" link. Current as of: March 12, 2020               Content Version: 12.8  © 2751-2897 Healthwise, Incorporated. Care instructions adapted under license by [facility] ([facility]).  If you have questions about a medical condition or this instruction, always ask your healthcare professional. [address] any warranty or liability for your use of this information. Patient Education        Learning About Benefits From Quitting Smoking  How does quitting smoking make you healthier? If you're thinking about quitting smoking, you may have a few reasons to be smoke-free. Your health may be one of them. · When you quit smoking, you lower your risks for cancer, lung disease, heart attack, stroke, blood vessel disease, and blindness from macular degeneration. · When you're smoke-free, you get sick less often, and you heal faster. You are less likely to get colds, flu, bronchitis, and pneumonia. · As a nonsmoker, you may find that your mood is better and you are less stressed. When and how will you feel healthier? Quitting has real health benefits that start from day 1 of being smoke-free. And the longer you stay smoke-free, the healthier you get and the better you feel. The first hours  · After just 20 minutes, your blood pressure and heart rate go down. That means there's less stress on your heart and blood vessels. · Within 12 hours, the level of carbon monoxide in your blood drops back to normal. That makes room for more oxygen. With more oxygen in your body, you may notice that you have more energy than when you smoked. After 2 weeks  · Your lungs start to work better. · Your risk of heart attack starts to drop. After 1 month  · When your lungs are clear, you cough less and breathe deeper, so it's easier to be active. · Your sense of taste and smell return. That means you can enjoy food more than you have since you started smoking. Over the years  · Over the years, your risks of heart disease, heart attack, and stroke are lower. · After 10 years, your risk of dying from lung cancer is cut by about half. And your risk for many other types of cancer is lower too. How would quitting help others in your life? When you quit smoking, you improve the health of everyone who now breathes in your smoke. · Their heart, lung, and cancer risks drop, much like yours. · They are sick less. For babies and small children, living smoke-free means they're less likely to have ear infections, pneumonia, and bronchitis. · If you're a woman who is or will be pregnant someday, quitting smoking means a healthier . · Children who are close to you are less likely to become adult smokers. Where can you learn more? Go to https://KEMP TechnologiespeVhall.[employer]. org and sign in to your [employer] account. Enter [id number] in the [employer] box to learn more about \"Learning About Benefits From Quitting Smoking. \"     If you do not have an account, please click on the \"Sign Up Now\" link. Current as of: 2020               Content Version: 12.8   [facility]. Care instructions adapted under license by [facility] ([facility]). If you have questions about a medical condition or this instruction, always ask your healthcare professional. [place] [address] any warranty or liability for your use of this information.

## 2021-05-21 NOTE — PROGRESS NOTES
2021    Patient's Name: Destiny Campos        : 1975)    CC: Med refill    HPI: 46YO male w/ ho on tobacco use disorder presents for routine f/u for med refil. He states he his chronic b/l hand pain has worsened since running out of this medications and will like a refill. The pain has been ongoing for years s/p cervical spine surgery several years ago. He used to follow a pain specialist but does not currently. He has also notice a knot in his right middle back and will like to have it examined. Otherwise she states he feels well. He is a current smoker but is willing to quit and will like refill on nicotine patches. He reports a chronic cough. He states that some of his coughing bouts can be so intense it causes him to feel faint. Those are not frequent though. He has labs pending from his past 2 visits and states he'll get them done today. ECHO and stress test are also still active. Currently denies F/C, night sweats, dizziness, HA, Orthopnea, PND, CP, URI sx, abd pain, N/V/D/C, joint pain and focal weakness. Review of Systems   A 10-organ Review Of Systems was obtained and otherwise unremarkable except as per HPI. Prior to Visit Medications    Medication Sig Taking? Authorizing Provider   nicotine (NICODERM CQ) 14 MG/24HR Place 1 patch onto the skin daily Yes Noe Church MD   diclofenac (VOLTAREN) 50 MG EC tablet Take 1 tablet by mouth 2 times daily Yes Noe Church MD   capsaicin (ZOSTRIX) 0.025 % cream Apply topically 2 times daily.  Yes Noe Church MD   naproxen (NAPROSYN) 500 MG tablet Take 1 tablet by mouth 2 times daily for 20 doses Yes Peng Casey MD        PHYSICAL EXAM    Vitals:    21 1338 21 1346   BP: (!) 144/99 (!) 137/91   Site: Right Upper Arm Right Upper Arm   Position: Sitting Sitting   Cuff Size: Medium Adult Medium Adult   Pulse: 94    Temp: 97.3 °F (36.3 °C)    TempSrc: Temporal    SpO2: 100%    Weight: 204 lb (92.5 kg)    Height: 5' 11\" (1.803 m) Estimated body mass index is 28.45 kg/m² as calculated from the following:    Height as of this encounter: 5' 11\" (1.803 m). Weight as of this encounter: 204 lb (92.5 kg). Physical Exam  Constitutional:       General: He is not in acute distress. Appearance: Normal appearance. He is not ill-appearing. HENT:      Head: Normocephalic and atraumatic. Eyes:      General: Vision grossly intact. Conjunctiva/sclera: Conjunctivae normal.   Cardiovascular:      Rate and Rhythm: Normal rate and regular rhythm. Pulses: Normal pulses. Heart sounds: Normal heart sounds, S1 normal and S2 normal. No murmur heard. No friction rub. No gallop. Pulmonary:      Effort: Pulmonary effort is normal. No respiratory distress. Breath sounds: Normal breath sounds and air entry. No wheezing or rales. Abdominal:      General: Bowel sounds are normal. There is no distension. Palpations: Abdomen is soft. Tenderness: There is no abdominal tenderness. Musculoskeletal:         General: Normal range of motion. Cervical back: Full passive range of motion without pain, normal range of motion and neck supple. Right lower leg: No edema. Left lower leg: No edema. Lymphadenopathy:      Cervical: No cervical adenopathy. Skin:     Capillary Refill: Capillary refill takes less than 2 seconds. Coloration: Skin is not pale. Comments: Mobile 1.5cm lump in right midback paraspinal. No erythema, induration or fluctuations noted. Only tender to deep palpation. Neurological:      Mental Status: He is alert and oriented to person, place, and time. Mental status is at baseline. Psychiatric:         Mood and Affect: Mood normal.         Speech: Speech normal.         Judgment: Judgment normal.          ASSESSMENT AND PLAN    1. Neuropathy  - diclofenac (VOLTAREN) 50 MG EC tablet; Take 1 tablet by mouth 2 times daily  Dispense: 30 tablet;  Refill: 0  - capsaicin (ZOSTRIX) 0.025 % cream; Apply topically 2 times daily. Dispense: 2 Tube; Refill: 2    2. Tobacco abuse  - Counseled on cessation and given pt eduction handout given  - nicotine (NICODERM CQ) 14 MG/24HR; Place 1 patch onto the skin daily  Dispense: 42 patch; Refill: 0    3. Lipoma  Located in right mid back. Not causing any obvious discomfort or structural impingement   - Will continue to monitor     4. Elevated BP   /99. Repeat 137/91.   - Counseled on lifestyle modifiction  - Will continue to monitor  - Labs pending    5. Healthcare maintenance  - Ho smoking. Pt declined Pneumonia Vaccine today    Return in about 6 months (around 11/21/2021). Pt staffed and disussed with Attending Agustin Lincoln MD.    Electronically signed by Harman Nur MD on 5/21/2021 at 4:18 PM     Addendum to Resident H& P/Progress note:  I have personally seen,examined and evaluated the patient.  I have reviewed the current history, physical findings, labs and assessment and plan and agree with note as documented by resident MD ( Aundria Duverney)      Agustin Lincoln MD, [address]

## 2021-08-10 DIAGNOSIS — G62.9 NEUROPATHY: ICD-10-CM

## 2021-08-27 ENCOUNTER — OFFICE VISIT (OUTPATIENT)
Dept: INTERNAL MEDICINE CLINIC | Age: 46
End: 2021-08-27
Payer: COMMERCIAL

## 2021-08-27 VITALS
HEART RATE: 91 BPM | BODY MASS INDEX: 28 KG/M2 | OXYGEN SATURATION: 99 % | DIASTOLIC BLOOD PRESSURE: 74 MMHG | HEIGHT: 71 IN | SYSTOLIC BLOOD PRESSURE: 110 MMHG | WEIGHT: 200 LBS | TEMPERATURE: 98.9 F

## 2021-08-27 DIAGNOSIS — Z72.0 TOBACCO ABUSE: ICD-10-CM

## 2021-08-27 DIAGNOSIS — D50.8 OTHER IRON DEFICIENCY ANEMIA: Primary | ICD-10-CM

## 2021-08-27 DIAGNOSIS — G62.9 NEUROPATHY: ICD-10-CM

## 2021-08-27 PROCEDURE — 99213 OFFICE O/P EST LOW 20 MIN: CPT | Performed by: STUDENT IN AN ORGANIZED HEALTH CARE EDUCATION/TRAINING PROGRAM

## 2021-08-27 RX ORDER — NICOTINE 21 MG/24HR
1 PATCH, TRANSDERMAL 24 HOURS TRANSDERMAL DAILY
Qty: 42 PATCH | Refills: 0 | Status: SHIPPED | OUTPATIENT
Start: 2021-08-27 | End: 2021-11-22

## 2021-08-27 RX ORDER — NAPROXEN 500 MG/1
500 TABLET ORAL 2 TIMES DAILY
Qty: 20 TABLET | Refills: 0 | Status: SHIPPED | OUTPATIENT
Start: 2021-08-27 | End: 2021-11-22 | Stop reason: ALTCHOICE

## 2021-08-27 ASSESSMENT — ENCOUNTER SYMPTOMS
DIARRHEA: 0
SHORTNESS OF BREATH: 0
STRIDOR: 0
APNEA: 0
COUGH: 0
PHOTOPHOBIA: 0
BACK PAIN: 1
RECTAL PAIN: 0
EYE REDNESS: 0
ABDOMINAL DISTENTION: 0
EYE DISCHARGE: 0
EYE ITCHING: 0
VOMITING: 0
WHEEZING: 0
FACIAL SWELLING: 0
CHEST TIGHTNESS: 0

## 2021-08-27 ASSESSMENT — PATIENT HEALTH QUESTIONNAIRE - PHQ9
SUM OF ALL RESPONSES TO PHQ9 QUESTIONS 1 & 2: 1
1. LITTLE INTEREST OR PLEASURE IN DOING THINGS: 1
2. FEELING DOWN, DEPRESSED OR HOPELESS: 0
SUM OF ALL RESPONSES TO PHQ QUESTIONS 1-9: 1

## 2021-08-27 NOTE — PROGRESS NOTES
Outpatient Clinic Established Patient Note    Patient: Katya Milan  : 1975 (50 y.o.)  Date: 2021    CC: chronic pain    HPI:      78-year-old male with a past medical history of chronic pain.  As per the patient he fell down a flight of stairs in 2017 and had injury to his neck for which she required surgery. [facility] is status post C4-C5 fusion.  After that he has chronically had pain in bilateral upper extremities ,right lower extremity, neck and lumbar region.  The patient has been to two pain clinics however he does not follow with those pain clinics anymore.  The patient states that they stopped accepting him due to him not attending the clinic regularly.  The patient states it was because  of transportation  issues however upon chart review it appears that they did not accept him anymore secondary to the patient missing out on his urine drug screens and marijuana being detected in his UDS once. On this visit he continues to complain of bilateral upper extremities and right lower extremities with neck and lower lumbar pain. He denies any chest pain, shortness of breath, abdominal pain, diarrhea, vomiting, constipation, melena, hematochezia, wt loss, any new sensory loss or motor weakness. He reports getting 2 shots of Pfizer vaccine. Home Meds:  Prior to Visit Medications    Medication Sig Taking? Authorizing Provider   diclofenac (VOLTAREN) 50 MG EC tablet Take 1 tablet by mouth 2 times daily Yes Michelle Taveras MD   nicotine (NICODERM CQ) 14 MG/24HR Place 1 patch onto the skin daily Yes Michelle Taveras MD   naproxen (NAPROSYN) 500 MG tablet Take 1 tablet by mouth 2 times daily for 20 doses Yes Michelle Taveras MD       Allergies:    Patient has no known allergies.     Health Maintenance Due   Topic Date Due    Hepatitis C screen  Never done    Pneumococcal 0-64 years Vaccine (1 of 2 - PPSV23) Never done    HIV screen  Never done    DTaP/Tdap/Td vaccine (1 - Tdap) Never done    Lipid screen  Never done    Colon cancer screen colonoscopy  Never done    COVID-19 Vaccine (2 - Pfizer 2-dose series) 07/22/2021       Immunization History   Administered Date(s) Administered    COVID-19, Pfizer, PF, 30mcg/0.3mL 07/01/2021       Review of Systems   Constitutional: Negative for activity change, appetite change, chills, fatigue and fever. HENT: Negative for congestion, dental problem, facial swelling and mouth sores. Eyes: Negative for photophobia, discharge, redness and itching. Respiratory: Negative for apnea, cough, chest tightness, shortness of breath, wheezing and stridor. Cardiovascular: Negative for chest pain and leg swelling. Gastrointestinal: Negative for abdominal distention, diarrhea, rectal pain and vomiting. Endocrine: Negative for cold intolerance and heat intolerance. Genitourinary: Negative for enuresis, flank pain, genital sores and penile swelling. Musculoskeletal: Positive for back pain, gait problem and neck pain. Negative for arthralgias, joint swelling, myalgias and neck stiffness. Neurological: Negative for dizziness, facial asymmetry, light-headedness and headaches. Psychiatric/Behavioral: Negative for agitation, behavioral problems and confusion. PHYSICAL EXAM:  /74 (Site: Left Upper Arm, Position: Sitting, Cuff Size: Large Adult)   Pulse 91   Temp 98.9 °F (37.2 °C) (Oral)   Ht 5' 11\" (1.803 m)   Wt 200 lb (90.7 kg)   SpO2 99%   BMI 27.89 kg/m²   Physical Exam  Constitutional:       General: He is not in acute distress. Appearance: Normal appearance. He is obese. HENT:      Head: Normocephalic and atraumatic. Nose: No congestion. Eyes:      Extraocular Movements: Extraocular movements intact. Pupils: Pupils are equal, round, and reactive to light. Cardiovascular:      Rate and Rhythm: Normal rate and regular rhythm. Pulses: Normal pulses. Heart sounds: Normal heart sounds. No murmur heard.      Pulmonary:      Effort: Bhupendra Luna  _______________  Steffanie Vera MD, 8/27/2021 2:13 PM   PGY-2

## 2021-10-21 DIAGNOSIS — G62.9 NEUROPATHY: ICD-10-CM

## 2021-11-20 ENCOUNTER — APPOINTMENT (OUTPATIENT)
Dept: GENERAL RADIOLOGY | Age: 46
End: 2021-11-20
Payer: COMMERCIAL

## 2021-11-20 ENCOUNTER — HOSPITAL ENCOUNTER (EMERGENCY)
Age: 46
Discharge: HOME | End: 2021-11-21
Attending: EMERGENCY MEDICINE
Payer: COMMERCIAL

## 2021-11-20 VITALS
WEIGHT: 199.96 LBS | SYSTOLIC BLOOD PRESSURE: 150 MMHG | RESPIRATION RATE: 16 BRPM | HEIGHT: 71 IN | DIASTOLIC BLOOD PRESSURE: 98 MMHG | TEMPERATURE: 98 F | OXYGEN SATURATION: 100 % | BODY MASS INDEX: 27.99 KG/M2 | HEART RATE: 90 BPM

## 2021-11-20 DIAGNOSIS — S82.851B TYPE I OR II OPEN TRIMALLEOLAR FRACTURE OF RIGHT ANKLE, INITIAL ENCOUNTER: Primary | ICD-10-CM

## 2021-11-20 PROCEDURE — 99284 EMERGENCY DEPT VISIT MOD MDM: CPT

## 2021-11-20 PROCEDURE — 6370000000 HC RX 637 (ALT 250 FOR IP): Performed by: EMERGENCY MEDICINE

## 2021-11-20 PROCEDURE — 29515 APPLICATION SHORT LEG SPLINT: CPT

## 2021-11-20 PROCEDURE — 73610 X-RAY EXAM OF ANKLE: CPT

## 2021-11-20 RX ORDER — HYDROCODONE BITARTRATE AND ACETAMINOPHEN 5; 325 MG/1; MG/1
1 TABLET ORAL EVERY 4 HOURS PRN
Qty: 12 TABLET | Refills: 0 | Status: SHIPPED | OUTPATIENT
Start: 2021-11-20 | End: 2021-11-22 | Stop reason: ALTCHOICE

## 2021-11-20 RX ORDER — OXYCODONE HYDROCHLORIDE AND ACETAMINOPHEN 5; 325 MG/1; MG/1
1 TABLET ORAL ONCE
Status: COMPLETED | OUTPATIENT
Start: 2021-11-20 | End: 2021-11-20

## 2021-11-20 RX ADMIN — OXYCODONE HYDROCHLORIDE AND ACETAMINOPHEN 1 TABLET: 5; 325 TABLET ORAL at 22:37

## 2021-11-20 ASSESSMENT — PAIN DESCRIPTION - LOCATION: LOCATION: ANKLE

## 2021-11-20 ASSESSMENT — PAIN DESCRIPTION - DESCRIPTORS: DESCRIPTORS: THROBBING

## 2021-11-20 ASSESSMENT — PAIN DESCRIPTION - PAIN TYPE: TYPE: ACUTE PAIN

## 2021-11-20 ASSESSMENT — PAIN DESCRIPTION - ORIENTATION: ORIENTATION: RIGHT

## 2021-11-20 ASSESSMENT — PAIN SCALES - GENERAL
PAINLEVEL_OUTOF10: 10
PAINLEVEL_OUTOF10: 10

## 2021-11-21 ASSESSMENT — ENCOUNTER SYMPTOMS
STRIDOR: 0
COUGH: 0
EYE PAIN: 0
ABDOMINAL DISTENTION: 0
DIARRHEA: 0
EYE DISCHARGE: 0
BACK PAIN: 0
PHOTOPHOBIA: 0
SHORTNESS OF BREATH: 0
NAUSEA: 0
BLOOD IN STOOL: 0
EYE ITCHING: 0
RECTAL PAIN: 0
ANAL BLEEDING: 0
ABDOMINAL PAIN: 0
CHEST TIGHTNESS: 0
COLOR CHANGE: 0
EYE REDNESS: 0
VOMITING: 0
APNEA: 0
WHEEZING: 0
CONSTIPATION: 0
CHOKING: 0
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
## 2021-11-21 NOTE — ED NOTES
Bed: A-15  Expected date: 11/20/21  Expected time: 10:16 PM  Means of arrival: [facility]  Comments:   Ankle deformity     Kristi Arriaza RN  11/20/21 [id number]

## 2021-11-21 NOTE — ED NOTES
D/C: Order noted for d/c. Pt confirmed d/c paperwork have correct name. Discharge and education instructions reviewed with patient. Teach-back successful. Pt verbalized understanding and signed d/c papers. Pt denied questions at this time. No acute distress noted. Patient instructed to follow-up as noted - return to emergency department if symptoms worsen. Patient verbalized understanding. Discharged per EDMD with discharge instructions. Pt discharged to private vehicle. Patient stable upon departure. Thanked patient for choosing [facility]) for care. Provider aware of patient pain at time of discharge.      Tiburcio Pyle RN  11/20/21 [id number]

## 2021-11-22 ENCOUNTER — TELEPHONE (OUTPATIENT)
Dept: ORTHOPEDIC SURGERY | Age: 46
End: 2021-11-22

## 2021-11-22 ENCOUNTER — OFFICE VISIT (OUTPATIENT)
Dept: INTERNAL MEDICINE CLINIC | Age: 46
End: 2021-11-22
Payer: COMMERCIAL

## 2021-11-22 VITALS
HEART RATE: 96 BPM | TEMPERATURE: 98.4 F | BODY MASS INDEX: 28.29 KG/M2 | SYSTOLIC BLOOD PRESSURE: 136 MMHG | OXYGEN SATURATION: 99 % | HEIGHT: 71 IN | DIASTOLIC BLOOD PRESSURE: 92 MMHG | WEIGHT: 202.1 LBS

## 2021-11-22 DIAGNOSIS — G62.9 NEUROPATHY: ICD-10-CM

## 2021-11-22 DIAGNOSIS — S82.851B TYPE I OR II OPEN TRIMALLEOLAR FRACTURE OF RIGHT ANKLE, INITIAL ENCOUNTER: ICD-10-CM

## 2021-11-22 DIAGNOSIS — Z01.818 PRE-OP EXAM: Primary | ICD-10-CM

## 2021-11-22 PROCEDURE — 99213 OFFICE O/P EST LOW 20 MIN: CPT | Performed by: STUDENT IN AN ORGANIZED HEALTH CARE EDUCATION/TRAINING PROGRAM

## 2021-11-22 RX ORDER — HYDROCODONE BITARTRATE AND ACETAMINOPHEN 7.5; 325 MG/1; MG/1
1 TABLET ORAL EVERY 6 HOURS PRN
Qty: 36 TABLET | Refills: 0 | Status: SHIPPED | OUTPATIENT
Start: 2021-11-22 | End: 2021-12-01

## 2021-11-22 ASSESSMENT — ENCOUNTER SYMPTOMS
VOMITING: 0
NAUSEA: 0
SORE THROAT: 0
PHOTOPHOBIA: 0
DIARRHEA: 0
TROUBLE SWALLOWING: 0
WHEEZING: 0
SHORTNESS OF BREATH: 0
CHEST TIGHTNESS: 0
ABDOMINAL PAIN: 0
ABDOMINAL DISTENTION: 0
COUGH: 0

## 2021-11-22 NOTE — PATIENT INSTRUCTIONS
Please reach out to the Orthopedic Surgeon to see them in the office this week. Dr. Rafaela Castro with [address] [facility]. [facility]  [phone]    Return to see us in about one month, after your surgery.

## 2021-11-22 NOTE — TELEPHONE ENCOUNTER
I called the patient and left a message.     Patient needs to make an appointment for today with Dr. Isaiah Salcedo
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
## 2021-12-01 ENCOUNTER — TELEPHONE (OUTPATIENT)
Dept: ORTHOPEDIC SURGERY | Age: 46
End: 2021-12-01

## 2021-12-01 ENCOUNTER — OFFICE VISIT (OUTPATIENT)
Dept: ORTHOPEDIC SURGERY | Age: 46
End: 2021-12-01
Payer: COMMERCIAL

## 2021-12-01 VITALS — BODY MASS INDEX: 28.19 KG/M2 | HEIGHT: 71 IN

## 2021-12-01 DIAGNOSIS — S82.851A CLOSED TRIMALLEOLAR FRACTURE OF RIGHT ANKLE, INITIAL ENCOUNTER: Primary | ICD-10-CM

## 2021-12-01 DIAGNOSIS — F17.200 CURRENT SMOKER: ICD-10-CM

## 2021-12-01 PROCEDURE — 99203 OFFICE O/P NEW LOW 30 MIN: CPT | Performed by: ORTHOPAEDIC SURGERY

## 2021-12-01 PROCEDURE — 4004F PT TOBACCO SCREEN RCVD TLK: CPT | Performed by: ORTHOPAEDIC SURGERY

## 2021-12-01 PROCEDURE — G8427 DOCREV CUR MEDS BY ELIG CLIN: HCPCS | Performed by: ORTHOPAEDIC SURGERY

## 2021-12-01 PROCEDURE — 99406 BEHAV CHNG SMOKING 3-10 MIN: CPT | Performed by: ORTHOPAEDIC SURGERY

## 2021-12-01 PROCEDURE — G8419 CALC BMI OUT NRM PARAM NOF/U: HCPCS | Performed by: ORTHOPAEDIC SURGERY

## 2021-12-01 PROCEDURE — G8484 FLU IMMUNIZE NO ADMIN: HCPCS | Performed by: ORTHOPAEDIC SURGERY

## 2021-12-01 RX ORDER — HYDROCODONE BITARTRATE AND ACETAMINOPHEN 5; 325 MG/1; MG/1
1 TABLET ORAL EVERY 6 HOURS PRN
Qty: 20 TABLET | Refills: 0 | Status: SHIPPED | OUTPATIENT
Start: 2021-12-01 | End: 2021-12-06

## 2021-12-01 RX ORDER — CEPHALEXIN 500 MG/1
500 CAPSULE ORAL 4 TIMES DAILY
Qty: 20 CAPSULE | Refills: 0 | Status: SHIPPED | OUTPATIENT
Start: 2021-12-01 | End: 2021-12-06

## 2021-12-01 NOTE — TELEPHONE ENCOUNTER
Auth: NPR  Date: 12/02/21  Reference # None  Spoke with: Online  Type of SX: Outpatient  Location: [facility]  CPT: 31246   DX: [id number]  SX area: Rt ankle  Insurance: [facility]

## 2021-12-01 NOTE — TELEPHONE ENCOUNTER
LVM for patient. Can take tylenol with pain medicine, but not to exceed 4,000 mg in a day. Pain med was sent to pharmacy by provider a few minutes ago.

## 2021-12-01 NOTE — TELEPHONE ENCOUNTER
Medication was ordered at visit, however not signed my physician before he had to leave for surgery. Will have physician approve medication this afternoon for patient.
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
## 2021-12-01 NOTE — PROGRESS NOTES
Patient not reached. Preop instructions left on voice mail. [phone]______________    -Date_12/2/2021______time_0800______arrival_0630  [facility]___________  -Nothing to eat or drink after midnight  -Responsible adult 18 or older to stay on site while you are here and drive you home and stay with you after  -Follow any instructions your doctors office has given you  -Bring a complete list of all your medications and supplements  -If you normally take the following medications in the morning please do so with a small    sip of water-heart,blood pressure,seizure,breathing or thyroid-avoid water pilll Do not take blood pressure medications ending in \"dayana\" or \"pril\" the AM of surgery or the sri prior  -You may use your inhalers  -Take half of your normal dose of any long acting insulins the night before-do not take    any diabetic medications in the morning  -Follow your doctors instructions regarding blood thinners  -Any questions call your surgeons office      COVID TEST        ___ Covid test to be done 3-5 days prior to scheduled surgery -patient aware they are REQUIRED to bring a copy of the negative test result DOS-if they receive a positive result to notify their surgeon          If known-indicate where patient is getting test done          ________________________________________    _x__ Rapid - DOS  Told to bring result if had covid test done, otherwise we will do Rapid here    ___ Other _____________________________      [facility] POLICY(subject to change)    There is a one visitor policy at [facility] for all surgeries and endoscopies. Whether the visitor can stay or will be asked to wait in the car will depend on the current policy and if social distancing can be maintained. The policy is subject to change at any time. Please make sure the visitor has a cell phone that is on,charged and able to accept calls, as this may be the way that the staff communicates with them. Pain management is NO VISITOR policyThe patients ride is expected to remain in the car with a cell phone for communication. If the ride is leaving the hospital grounds please make sure they are back in time for pickup. Have the patient inform the staff on arrival what their rides plans are while the patient is in the facility. At the MAIN there is one visitor allowed. Please note that the visitor policy is subject to change.

## 2021-12-01 NOTE — TELEPHONE ENCOUNTER
General Question:  PATIENT IS WANTING TO KNOW IF HE CAN TAKE TYLENOL .  . .      Subject: PATIENT IS CALLING REGARDING THE STATUS OF HIS MEDICINE    Patient:  Yina Sterling  Contact Number: [phone]

## 2021-12-02 ENCOUNTER — APPOINTMENT (OUTPATIENT)
Dept: GENERAL RADIOLOGY | Age: 46
End: 2021-12-02
Attending: ORTHOPAEDIC SURGERY
Payer: COMMERCIAL

## 2021-12-02 ENCOUNTER — ANESTHESIA EVENT (OUTPATIENT)
Dept: OPERATING ROOM | Age: 46
End: 2021-12-02
Payer: COMMERCIAL

## 2021-12-02 ENCOUNTER — ANESTHESIA (OUTPATIENT)
Dept: OPERATING ROOM | Age: 46
End: 2021-12-02
Payer: COMMERCIAL

## 2021-12-02 ENCOUNTER — HOSPITAL ENCOUNTER (OUTPATIENT)
Age: 46
Setting detail: OUTPATIENT SURGERY
Discharge: HOME | End: 2021-12-02
Attending: ORTHOPAEDIC SURGERY | Admitting: ORTHOPAEDIC SURGERY
Payer: COMMERCIAL

## 2021-12-02 VITALS
WEIGHT: 200 LBS | HEART RATE: 98 BPM | DIASTOLIC BLOOD PRESSURE: 109 MMHG | SYSTOLIC BLOOD PRESSURE: 166 MMHG | RESPIRATION RATE: 18 BRPM | HEIGHT: 71 IN | BODY MASS INDEX: 28 KG/M2 | TEMPERATURE: 97 F | OXYGEN SATURATION: 93 %

## 2021-12-02 VITALS
OXYGEN SATURATION: 84 % | RESPIRATION RATE: 14 BRPM | DIASTOLIC BLOOD PRESSURE: 102 MMHG | SYSTOLIC BLOOD PRESSURE: 162 MMHG

## 2021-12-02 LAB — SARS-COV-2, NAAT: NOT DETECTED

## 2021-12-02 PROCEDURE — 2709999900 HC NON-CHARGEABLE SUPPLY: Performed by: ORTHOPAEDIC SURGERY

## 2021-12-02 PROCEDURE — 73600 X-RAY EXAM OF ANKLE: CPT

## 2021-12-02 PROCEDURE — 2580000003 HC RX 258: Performed by: ORTHOPAEDIC SURGERY

## 2021-12-02 PROCEDURE — 7100000001 HC PACU RECOVERY - ADDTL 15 MIN: Performed by: ORTHOPAEDIC SURGERY

## 2021-12-02 PROCEDURE — 6360000002 HC RX W HCPCS: Performed by: ORTHOPAEDIC SURGERY

## 2021-12-02 PROCEDURE — 7100000010 HC PHASE II RECOVERY - FIRST 15 MIN: Performed by: ORTHOPAEDIC SURGERY

## 2021-12-02 PROCEDURE — 3209999900 FLUORO FOR SURGICAL PROCEDURES

## 2021-12-02 PROCEDURE — 2720000010 HC SURG SUPPLY STERILE: Performed by: ORTHOPAEDIC SURGERY

## 2021-12-02 PROCEDURE — 27827 TREAT LOWER LEG FRACTURE: CPT | Performed by: ORTHOPAEDIC SURGERY

## 2021-12-02 PROCEDURE — 2500000003 HC RX 250 WO HCPCS: Performed by: ORTHOPAEDIC SURGERY

## 2021-12-02 PROCEDURE — 3600000014 HC SURGERY LEVEL 4 ADDTL 15MIN: Performed by: ORTHOPAEDIC SURGERY

## 2021-12-02 PROCEDURE — 2580000003 HC RX 258: Performed by: NURSE ANESTHETIST, CERTIFIED REGISTERED

## 2021-12-02 PROCEDURE — 3700000001 HC ADD 15 MINUTES (ANESTHESIA): Performed by: ORTHOPAEDIC SURGERY

## 2021-12-02 PROCEDURE — 2580000003 HC RX 258: Performed by: FAMILY MEDICINE

## 2021-12-02 PROCEDURE — 6360000002 HC RX W HCPCS

## 2021-12-02 PROCEDURE — 3600000004 HC SURGERY LEVEL 4 BASE: Performed by: ORTHOPAEDIC SURGERY

## 2021-12-02 PROCEDURE — 6370000000 HC RX 637 (ALT 250 FOR IP): Performed by: FAMILY MEDICINE

## 2021-12-02 PROCEDURE — 3700000000 HC ANESTHESIA ATTENDED CARE: Performed by: ORTHOPAEDIC SURGERY

## 2021-12-02 PROCEDURE — C1713 ANCHOR/SCREW BN/BN,TIS/BN: HCPCS | Performed by: ORTHOPAEDIC SURGERY

## 2021-12-02 PROCEDURE — 6360000002 HC RX W HCPCS: Performed by: FAMILY MEDICINE

## 2021-12-02 PROCEDURE — 27792 TREATMENT OF ANKLE FRACTURE: CPT | Performed by: ORTHOPAEDIC SURGERY

## 2021-12-02 PROCEDURE — 87635 SARS-COV-2 COVID-19 AMP PRB: CPT

## 2021-12-02 PROCEDURE — 27827 TREAT LOWER LEG FRACTURE: CPT | Performed by: NURSE PRACTITIONER

## 2021-12-02 PROCEDURE — 6360000002 HC RX W HCPCS: Performed by: NURSE ANESTHETIST, CERTIFIED REGISTERED

## 2021-12-02 PROCEDURE — 7100000011 HC PHASE II RECOVERY - ADDTL 15 MIN: Performed by: ORTHOPAEDIC SURGERY

## 2021-12-02 PROCEDURE — 7100000000 HC PACU RECOVERY - FIRST 15 MIN: Performed by: ORTHOPAEDIC SURGERY

## 2021-12-02 PROCEDURE — 2500000003 HC RX 250 WO HCPCS: Performed by: NURSE ANESTHETIST, CERTIFIED REGISTERED

## 2021-12-02 DEVICE — WASHER ORTH FOR 3.5 4MM CANN SCR: Type: IMPLANTABLE DEVICE | Site: ANKLE | Status: FUNCTIONAL

## 2021-12-02 DEVICE — IMPLANTABLE DEVICE: Type: IMPLANTABLE DEVICE | Site: ANKLE | Status: FUNCTIONAL

## 2021-12-02 DEVICE — SCREW BNE L14MM DIA3.5MM HD DIA2.7MM CORT PERIARTC S STL ST: Type: IMPLANTABLE DEVICE | Site: ANKLE | Status: FUNCTIONAL

## 2021-12-02 DEVICE — PLATE BNE L80MM 4 H R LAT DST PERIARTC FIBULAR S STL LOK: Type: IMPLANTABLE DEVICE | Site: ANKLE | Status: FUNCTIONAL

## 2021-12-02 DEVICE — SCREW BNE L20MM DIA3.5MM HD DIA2.7MM CORT PERIARTC S STL ST: Type: IMPLANTABLE DEVICE | Site: ANKLE | Status: FUNCTIONAL

## 2021-12-02 DEVICE — SCREW BNE L16MM DIA2.7MM HEX HD DIA2.5MM CANC BIODUR 108C: Type: IMPLANTABLE DEVICE | Site: ANKLE | Status: FUNCTIONAL

## 2021-12-02 DEVICE — SCREW BNE L18MM DIA2.7MM HEX HD DIA2.5MM CANC BIODUR 108C: Type: IMPLANTABLE DEVICE | Site: ANKLE | Status: FUNCTIONAL

## 2021-12-02 DEVICE — SCREW BNE L16MM DIA3.5MM HD DIA2.7MM PERIARTC CORT S STL ST: Type: IMPLANTABLE DEVICE | Site: ANKLE | Status: FUNCTIONAL

## 2021-12-02 DEVICE — SCREW BNE L20MM DIA2.7MM HEX HD DIA2.5MM CANC BIODUR 108C: Type: IMPLANTABLE DEVICE | Site: ANKLE | Status: FUNCTIONAL

## 2021-12-02 RX ORDER — FENTANYL CITRATE 50 UG/ML
50 INJECTION, SOLUTION INTRAMUSCULAR; INTRAVENOUS EVERY 5 MIN PRN
Status: DISCONTINUED | OUTPATIENT
Start: 2021-12-02 | End: 2021-12-02 | Stop reason: HOSPADM

## 2021-12-02 RX ORDER — MEPERIDINE HYDROCHLORIDE 25 MG/ML
12.5 INJECTION INTRAMUSCULAR; INTRAVENOUS; SUBCUTANEOUS EVERY 5 MIN PRN
Status: DISCONTINUED | OUTPATIENT
Start: 2021-12-02 | End: 2021-12-02 | Stop reason: HOSPADM

## 2021-12-02 RX ORDER — FENTANYL CITRATE 50 UG/ML
INJECTION, SOLUTION INTRAMUSCULAR; INTRAVENOUS PRN
Status: DISCONTINUED | OUTPATIENT
Start: 2021-12-02 | End: 2021-12-02 | Stop reason: SDUPTHER

## 2021-12-02 RX ORDER — MIDAZOLAM HYDROCHLORIDE 1 MG/ML
INJECTION INTRAMUSCULAR; INTRAVENOUS PRN
Status: DISCONTINUED | OUTPATIENT
Start: 2021-12-02 | End: 2021-12-02 | Stop reason: SDUPTHER

## 2021-12-02 RX ORDER — BUPIVACAINE HYDROCHLORIDE 5 MG/ML
INJECTION, SOLUTION EPIDURAL; INTRACAUDAL
Status: COMPLETED | OUTPATIENT
Start: 2021-12-02 | End: 2021-12-02

## 2021-12-02 RX ORDER — LIDOCAINE HYDROCHLORIDE 20 MG/ML
INJECTION, SOLUTION INFILTRATION; PERINEURAL PRN
Status: DISCONTINUED | OUTPATIENT
Start: 2021-12-02 | End: 2021-12-02 | Stop reason: SDUPTHER

## 2021-12-02 RX ORDER — HYDROMORPHONE HCL 110MG/55ML
0.25 PATIENT CONTROLLED ANALGESIA SYRINGE INTRAVENOUS EVERY 5 MIN PRN
Status: DISCONTINUED | OUTPATIENT
Start: 2021-12-02 | End: 2021-12-02 | Stop reason: HOSPADM

## 2021-12-02 RX ORDER — DEXAMETHASONE SODIUM PHOSPHATE 4 MG/ML
INJECTION, SOLUTION INTRA-ARTICULAR; INTRALESIONAL; INTRAMUSCULAR; INTRAVENOUS; SOFT TISSUE PRN
Status: DISCONTINUED | OUTPATIENT
Start: 2021-12-02 | End: 2021-12-02 | Stop reason: SDUPTHER

## 2021-12-02 RX ORDER — HYDROMORPHONE HCL 110MG/55ML
0.5 PATIENT CONTROLLED ANALGESIA SYRINGE INTRAVENOUS EVERY 5 MIN PRN
Status: DISCONTINUED | OUTPATIENT
Start: 2021-12-02 | End: 2021-12-02 | Stop reason: HOSPADM

## 2021-12-02 RX ORDER — PROPOFOL 10 MG/ML
INJECTION, EMULSION INTRAVENOUS PRN
Status: DISCONTINUED | OUTPATIENT
Start: 2021-12-02 | End: 2021-12-02 | Stop reason: SDUPTHER

## 2021-12-02 RX ORDER — OXYCODONE HYDROCHLORIDE 5 MG/1
5 TABLET ORAL PRN
Status: COMPLETED | OUTPATIENT
Start: 2021-12-02 | End: 2021-12-02

## 2021-12-02 RX ORDER — MEPERIDINE HYDROCHLORIDE 50 MG/ML
INJECTION INTRAMUSCULAR; INTRAVENOUS; SUBCUTANEOUS
Status: COMPLETED
Start: 2021-12-02 | End: 2021-12-02

## 2021-12-02 RX ORDER — SODIUM CHLORIDE 9 MG/ML
INJECTION, SOLUTION INTRAVENOUS CONTINUOUS
Status: DISCONTINUED | OUTPATIENT
Start: 2021-12-02 | End: 2021-12-02 | Stop reason: HOSPADM

## 2021-12-02 RX ORDER — ONDANSETRON 2 MG/ML
INJECTION INTRAMUSCULAR; INTRAVENOUS PRN
Status: DISCONTINUED | OUTPATIENT
Start: 2021-12-02 | End: 2021-12-02 | Stop reason: SDUPTHER

## 2021-12-02 RX ORDER — DIPHENHYDRAMINE HYDROCHLORIDE 50 MG/ML
12.5 INJECTION INTRAMUSCULAR; INTRAVENOUS
Status: DISCONTINUED | OUTPATIENT
Start: 2021-12-02 | End: 2021-12-02 | Stop reason: HOSPADM

## 2021-12-02 RX ORDER — LABETALOL HYDROCHLORIDE 5 MG/ML
5 INJECTION, SOLUTION INTRAVENOUS EVERY 10 MIN PRN
Status: DISCONTINUED | OUTPATIENT
Start: 2021-12-02 | End: 2021-12-02 | Stop reason: HOSPADM

## 2021-12-02 RX ORDER — PROMETHAZINE HYDROCHLORIDE 25 MG/ML
6.25 INJECTION, SOLUTION INTRAMUSCULAR; INTRAVENOUS PRN
Status: DISCONTINUED | OUTPATIENT
Start: 2021-12-02 | End: 2021-12-02 | Stop reason: HOSPADM

## 2021-12-02 RX ORDER — SODIUM CHLORIDE 9 MG/ML
INJECTION, SOLUTION INTRAVENOUS CONTINUOUS PRN
Status: DISCONTINUED | OUTPATIENT
Start: 2021-12-02 | End: 2021-12-02 | Stop reason: SDUPTHER

## 2021-12-02 RX ORDER — OXYCODONE HYDROCHLORIDE 5 MG/1
10 TABLET ORAL PRN
Status: COMPLETED | OUTPATIENT
Start: 2021-12-02 | End: 2021-12-02

## 2021-12-02 RX ADMIN — LIDOCAINE HYDROCHLORIDE 100 MG: 20 INJECTION, SOLUTION INFILTRATION; PERINEURAL at 07:52

## 2021-12-02 RX ADMIN — HYDROMORPHONE HYDROCHLORIDE 0.5 MG: 2 INJECTION, SOLUTION INTRAMUSCULAR; INTRAVENOUS; SUBCUTANEOUS at 09:35

## 2021-12-02 RX ADMIN — DEXAMETHASONE SODIUM PHOSPHATE 8 MG: 4 INJECTION, SOLUTION INTRAMUSCULAR; INTRAVENOUS at 07:54

## 2021-12-02 RX ADMIN — SODIUM CHLORIDE: 9 INJECTION, SOLUTION INTRAVENOUS at 07:49

## 2021-12-02 RX ADMIN — PROMETHAZINE HYDROCHLORIDE 6.25 MG: 25 INJECTION, SOLUTION INTRAMUSCULAR; INTRAVENOUS at 09:32

## 2021-12-02 RX ADMIN — FENTANYL CITRATE 50 MCG: 50 INJECTION, SOLUTION INTRAMUSCULAR; INTRAVENOUS at 07:52

## 2021-12-02 RX ADMIN — HYDROMORPHONE HYDROCHLORIDE 0.5 MG: 2 INJECTION, SOLUTION INTRAMUSCULAR; INTRAVENOUS; SUBCUTANEOUS at 09:27

## 2021-12-02 RX ADMIN — SODIUM CHLORIDE: 9 INJECTION, SOLUTION INTRAVENOUS at 07:15

## 2021-12-02 RX ADMIN — PROPOFOL 200 MG: 10 INJECTION, EMULSION INTRAVENOUS at 07:52

## 2021-12-02 RX ADMIN — MIDAZOLAM 2 MG: 1 INJECTION INTRAMUSCULAR; INTRAVENOUS at 07:52

## 2021-12-02 RX ADMIN — ONDANSETRON 4 MG: 2 INJECTION INTRAMUSCULAR; INTRAVENOUS at 07:54

## 2021-12-02 RX ADMIN — CEFAZOLIN 2000 MG: 10 INJECTION, POWDER, FOR SOLUTION INTRAVENOUS at 07:47

## 2021-12-02 RX ADMIN — FENTANYL CITRATE 50 MCG: 50 INJECTION, SOLUTION INTRAMUSCULAR; INTRAVENOUS at 08:05

## 2021-12-02 RX ADMIN — MEPERIDINE HYDROCHLORIDE 12.5 MG: 50 INJECTION INTRAMUSCULAR; INTRAVENOUS; SUBCUTANEOUS at 09:55

## 2021-12-02 RX ADMIN — OXYCODONE 5 MG: 5 TABLET ORAL at 10:14

## 2021-12-02 ASSESSMENT — PULMONARY FUNCTION TESTS
PIF_VALUE: 3
PIF_VALUE: 4
PIF_VALUE: 3
PIF_VALUE: 11
PIF_VALUE: 11
PIF_VALUE: 10
PIF_VALUE: 3
PIF_VALUE: 10
PIF_VALUE: 5
PIF_VALUE: 14
PIF_VALUE: 3
PIF_VALUE: 8
PIF_VALUE: 0
PIF_VALUE: 11
PIF_VALUE: 12
PIF_VALUE: 10
PIF_VALUE: 4
PIF_VALUE: 10
PIF_VALUE: 11
PIF_VALUE: 0
PIF_VALUE: 23
PIF_VALUE: 12
PIF_VALUE: 2
PIF_VALUE: 10
PIF_VALUE: 12
PIF_VALUE: 7
PIF_VALUE: 5
PIF_VALUE: 12
PIF_VALUE: 3
PIF_VALUE: 11
PIF_VALUE: 6
PIF_VALUE: 13
PIF_VALUE: 8
PIF_VALUE: 20
PIF_VALUE: 14
PIF_VALUE: 11
PIF_VALUE: 12
PIF_VALUE: 5
PIF_VALUE: 11
PIF_VALUE: 0
PIF_VALUE: 14
PIF_VALUE: 5
PIF_VALUE: 6
PIF_VALUE: 21
PIF_VALUE: 3
PIF_VALUE: 9
PIF_VALUE: 10
PIF_VALUE: 6
PIF_VALUE: 10
PIF_VALUE: 6
PIF_VALUE: 13
PIF_VALUE: 17
PIF_VALUE: 13
PIF_VALUE: 10
PIF_VALUE: 3
PIF_VALUE: 5
PIF_VALUE: 10
PIF_VALUE: 4
PIF_VALUE: 14
PIF_VALUE: 15
PIF_VALUE: 10
PIF_VALUE: 10
PIF_VALUE: 2
PIF_VALUE: 10
PIF_VALUE: 21
PIF_VALUE: 5
PIF_VALUE: 3
PIF_VALUE: 11
PIF_VALUE: 14
PIF_VALUE: 5
PIF_VALUE: 11
PIF_VALUE: 15
PIF_VALUE: 0
PIF_VALUE: 3
PIF_VALUE: 1
PIF_VALUE: 12
PIF_VALUE: 3
PIF_VALUE: 25
PIF_VALUE: 19
PIF_VALUE: 10
PIF_VALUE: 11
PIF_VALUE: 12
PIF_VALUE: 11
PIF_VALUE: 21
PIF_VALUE: 12
PIF_VALUE: 12

## 2021-12-02 ASSESSMENT — PAIN DESCRIPTION - ORIENTATION
ORIENTATION: RIGHT

## 2021-12-02 ASSESSMENT — PAIN DESCRIPTION - ONSET
ONSET: PROGRESSIVE
ONSET: AWAKENED FROM SLEEP

## 2021-12-02 ASSESSMENT — PAIN DESCRIPTION - DESCRIPTORS
DESCRIPTORS: ACHING;DISCOMFORT

## 2021-12-02 ASSESSMENT — PAIN DESCRIPTION - PAIN TYPE
TYPE: SURGICAL PAIN
TYPE: SURGICAL PAIN

## 2021-12-02 ASSESSMENT — PAIN SCALES - GENERAL
PAINLEVEL_OUTOF10: 10
PAINLEVEL_OUTOF10: 6
PAINLEVEL_OUTOF10: 8

## 2021-12-02 ASSESSMENT — PAIN DESCRIPTION - FREQUENCY
FREQUENCY: INTERMITTENT
FREQUENCY: CONTINUOUS
FREQUENCY: CONTINUOUS

## 2021-12-02 ASSESSMENT — PAIN DESCRIPTION - LOCATION
LOCATION: ANKLE

## 2021-12-02 ASSESSMENT — PAIN DESCRIPTION - PROGRESSION
CLINICAL_PROGRESSION: GRADUALLY WORSENING
CLINICAL_PROGRESSION: GRADUALLY IMPROVING
CLINICAL_PROGRESSION: GRADUALLY IMPROVING

## 2021-12-02 ASSESSMENT — PAIN - FUNCTIONAL ASSESSMENT: PAIN_FUNCTIONAL_ASSESSMENT: 0-10

## 2021-12-02 NOTE — ANESTHESIA POSTPROCEDURE EVALUATION
Department of Anesthesiology  Postprocedure Note    Patient: Daljit Bernal  MRN: [id number]  YOB: 1975  Date of evaluation: 12/2/2021  Time:  9:37 AM     Procedure Summary     Date: 12/02/21 Room / Location: [place] [address] / [facility]    Anesthesia Start: 1341 Anesthesia Stop: 9110    Procedure: OPEN REDUCTION INTERNAL FIXATION RIGHT ANKLE FRACTURE - Cheryl Pear (Right Ankle) Diagnosis: (S82.851A RIGHT TRIMALLEOLAR FRACTURE)    Surgeons: Irma Trinidad MD Responsible Provider: Usha Toth MD    Anesthesia Type: general ASA Status: 1          Anesthesia Type: general    Cesario Phase I: Cesario Score: 9    Cesario Phase II:      Last vitals: Reviewed and per EMR flowsheets.        Anesthesia Post Evaluation    Patient location during evaluation: PACU  Level of consciousness: awake  Complications: no  Multimodal analgesia pain management approach

## 2021-12-02 NOTE — PROGRESS NOTES
Pt transferred to phase 2 level of care @ 4440. Awake, c/o pain to Right ankle treated with dilaudid 0.5 mg IV x 2 doses with fair results. Earlier nausea subsided, tolerating ice chips po. Family provided update.
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
## 2021-12-03 DIAGNOSIS — S82.851A CLOSED TRIMALLEOLAR FRACTURE OF RIGHT ANKLE, INITIAL ENCOUNTER: Primary | ICD-10-CM

## 2021-12-03 RX ORDER — TRAMADOL HYDROCHLORIDE 50 MG/1
50 TABLET ORAL EVERY 8 HOURS PRN
Qty: 15 TABLET | Refills: 0 | Status: CANCELLED | OUTPATIENT
Start: 2021-12-06 | End: 2021-12-11

## 2021-12-03 RX ORDER — HYDROCODONE BITARTRATE AND ACETAMINOPHEN 5; 325 MG/1; MG/1
1 TABLET ORAL EVERY 6 HOURS PRN
Qty: 20 TABLET | Refills: 0 | Status: SHIPPED | OUTPATIENT
Start: 2021-12-06 | End: 2021-12-11

## 2021-12-03 RX ORDER — HYDROCODONE BITARTRATE AND ACETAMINOPHEN 5; 325 MG/1; MG/1
1 TABLET ORAL EVERY 6 HOURS PRN
Qty: 20 TABLET | Refills: 0 | Status: SHIPPED | OUTPATIENT
Start: 2021-12-06 | End: 2021-12-03 | Stop reason: CLARIF

## 2021-12-03 NOTE — BRIEF OP NOTE
Brief Postoperative Note      Patient: Bereket Shaw  YOB: 1975  MRN: [id number]    Date of Procedure: 12/2/2021    Pre-Op Diagnosis: S82.851A RIGHT DISTAL TIBIA PILON FRACTURE AND LATERAL MALLEOLUS FRACTURE. Post-Op Diagnosis: Same       Procedure(s):  OPEN REDUCTION INTERNAL FIXATION RIGHT ANKLE DISTAL TIBIA PILON FRACTURE AND LATERAL MALLEOLUS FRACTURE. Surgeon(s):  Chelo Salinas MD    Assistant: Amie Delong CNP    Anesthesia: General    Estimated Blood Loss (mL): Minimal    Complications: None    Specimens:   * No specimens in log *    Implants:  Implant Name Type Inv. Item Serial No.  Lot No. LRB No. Used Action   WASHER ORTH FOR 3.5 4MM KAE SCR  WASHER ORTH FOR 3.5 4MM KAE SCR  KYLE BIOMET TRAUMA-WD  Right 1 Implanted   SCREW BNE L36MM DIA4MM CANC SELF DRL ST KAE NONLOCKING 1/2  SCREW BNE L36MM DIA4MM CANC SELF DRL ST KAE NONLOCKING 1/2  [employer] TRAUMA-WD  Right 1 Implanted   SCREW BNE L32MM DIA3.5MM UNIV СВЕТЛАНА S STL ST NONCANNULATED  SCREW BNE L32MM DIA3.5MM UNIV СВЕТЛАНА S STL ST NONCANNULATED  KYLE [employer]OMET TRAUMA-WD  Right 1 Implanted   SCREW BNE L36MM DIA3. 5MM LNG СВЕТЛАНА S STL ST NONCANNULATED  SCREW BNE L36MM DIA3. 5MM LNG СВЕТЛАНА S STL ST NONCANNULATED  KYLE [employer]ET TRAUMA-WD  Right 1 Implanted   SCREW BNE L40MM DIA3.5MM UNIV СВЕТЛАНА S STL ST NONCANNULATED  SCREW BNE L40MM DIA3.5MM UNIV СВЕТЛАНА S STL ST NONCANNULATED  KYLE [employer]ET TRAUMA-WD  Right 1 Implanted   PLATE BNE [id number] 4 H BILAT S STL 1/3 TBLR FOR 3.5MM SCR  PLATE BNE [id number] 4 H BILAT S STL 1/3 TBLR FOR 3.5MM SCR  KY[employer] TRAUMA-WD  Right 1 Implanted   SCREW BNE L14MM DIA3. 5MM HD DIA2.7MM СВЕТЛАНА PERIARTC S STL ST  SCREW BNE L14MM DIA3. 5MM HD DIA2.7MM СВЕТЛАНА PERIARTC S STL ST  KYLE BIOMET TRAUMA-WD  Right 1 Implanted   SCREW BNE L16MM DIA3. 5MM HD DIA2.7MM PERIARTC СВЕТЛАНА S STL ST  SCREW BNE L16MM DIA3. 5MM HD DIA2.7MM PERIARTC СВЕТЛАНА S STL ST  KYLE BIOMET TRAUMA-WD  Right 2 Implanted   SCREW BNE L20MM DIA3. 5MM HD DIA2.7MM СВЕТЛАНА PERIARTC S STL ST  SCREW BNE L20MM DIA3. 5MM HD DIA2.7MM СВЕТЛАНА PERIARTC S STL ST  KYLE BIOMET TRAUMA-WD  Right 1 Implanted   SCREW BNE L16MM DIA2.7MM HEX HD DIA2.5MM CANC BIODUR 108C  SCREW BNE L16MM DIA2.7MM HEX HD DIA2.5MM CANC BIODUR 108C  KYLE [employer]ET TRAUMA-WD  Right 1 Implanted   SCREW BNE L18MM DIA2.7MM HEX HD DIA2.5MM CANC BIODUR 108C  SCREW BNE L18MM DIA2.7MM HEX HD DIA2.5MM CANC BIODUR 108C  KYLE BIOMET TRAUMA-WD  Right 2 Implanted   SCREW BNE L20MM DIA2.7MM HEX HD DIA2.5MM CANC BIODUR 108C  SCREW BNE L20MM DIA2.7MM HEX HD DIA2.5MM CANC BIODUR 108C  KYLE BIOMET TRAUMA-WD  Right 2 Implanted   PLATE BNE [id number] 4 H R LAT DST PERIARTC FIBULAR S STL REECE  PLATE BNE [id number] 4 H R LAT DST PERIARTC FIBULAR S STL REECE  KYLE BIOMET TRAUMA-WD  Right 1 Implanted         Drains: * No LDAs found *    Findings: Same    Electronically signed by Mandie Mckeon MD on 12/3/2021 at 12:31 PM

## 2021-12-03 NOTE — TELEPHONE ENCOUNTER
Prescription Refill     Medication Name:  Hydrocodone-Acetaminophen  [facility]   Patient Contact Number: [phone]

## 2021-12-04 NOTE — OP NOTE
[facility]                     [facility], [address]                                OPERATIVE REPORT    PATIENT NAME: Lynn Dunham                         :        1975  MED REC NO:   [id number]                          ROOM:  ACCOUNT NO:   [de-identified]                           ADMIT DATE: 2021  PROVIDER:     Yola Perry MD    DATE OF PROCEDURE:  2021    PRIMARY CARE PHYSICIAN:  Tana Sherman DO    PREOPERATIVE DIAGNOSES:  1. Right ankle distal tibia medial pilon intraarticular fracture. 2.  Right ankle lateral malleolus displaced fracture. POSTOPERATIVE DIAGNOSES:  1. Right ankle distal tibia medial pilon intraarticular fracture. 2.  Right ankle lateral malleolus displaced fracture. OPERATION PERFORMED:  1. Open treatment of right ankle distal tibia pilon fracture with open  reduction and internal fixation. 2.  Open treatment of right ankle lateral malleolus fracture with open  reduction and internal fixation. SURGEON:  Yola Perry MD    ASSISTANT:  Christian Harkins CNP    ANESTHESIA:  General anesthesia. ESTIMATED BLOOD LOSS:  Minimal.    COMPLICATIONS:  None. TOURNIQUET:  Right upper thigh 300 mmHg. IMPLANTS USED:  1. Obi distal fibula locking plate and one lag screw. 2.  Obi 4-hole one-third tubular plate for the medial distal tibia  pilon fracture. 3.  Obi 4.0 partially-threaded cannulated screws with washer for the  distal tibia pilon fracture. INDICATIONS:  This is a 78[facility] who sustained  injury two weeks ago while he was walking and slipped in the akhtar. He  was seen in the office and we recommended surgical treatment given the  displacement of the fracture. All risks, benefits, and alternatives  were discussed with the patient and he agreed to proceed with the  surgical fixation.     Given the patient's 3weeks old fracture added significant challenge to the procedure. It required significant physical and mental effort. It required 80% more time for such procedure. DESCRIPTION OF THE PROCEDURE:  The patient's right ankle was marked. He  received 2 gm Ancef IV preoperatively. The patient was then brought to  the operating room and underwent general anesthesia. A well-padded  tourniquet was placed in the right upper thigh. The right lower  extremity was then prepped and draped in a regular sterile routine  fashion. A time-out was called confirming the patient name, site, and  procedure. Esmarch was used for exsanguination. Tourniquet was inflated to 300  mmHg. A lateral incision was made over the distal fibula. The fracture  was then exposed. It was found to be markedly displaced. It was  significantly challenging physically and mentally very difficult given  that the patient has a two weeks old fracture. We removed the  interposing callus and we were able to reduce it anatomically. While  maintaining the reduction, we put one lag screw from anterior to  posterior. That stabilized the fracture provisionally. We then put the  plate in appropriate position and that is the distal fibula locking  plate from Obi. We then put one screw in the oblong hole and we  locked it with total of five distal 2.7 locking screws. We added two  more cortical screws in the shaft. At this point, we turned the  attention towards the medial side. Incision was made over the medial  distal tibia. We then exposed the fracture that was moderately  displaced. It also was significantly challenging given that the patient  has a two weeks old fracture. There was soft callus interposing into  the fracture site. We opened up the fracture site and able to clean up  the fracture from interposing soft tissue and organized fracture hematoma. We then reduced the fracture anatomically and we used the K-wire to  stabilize the fracture provisionally.   We then elected to use a 4-hole  one-third tubular plate to buttress the medial distal tibia. We put the  plate in appropriate position. We did put the cannulated screws first  to stabilize the fracture provisionally. We then placed three screws  across the distal tibia pilon fracture with good anatomic reduction. At  this point, we confirmed with the C-arm that the fracture anatomically  reduced. The syndesmosis was found to be stable. At this point, we let  the tourniquet down and hemostasis was secured. We irrigated the  incision copiously with normal saline mixed with gentamicin. We then  closed the deep layer with a 2-0 Vicryl, subcu with a 3-0 Vicryl, and  skin with a 4-0 Monocryl. Steri-Strips were then applied. Dressing was  then applied in the form of Xeroform, 4 x 4, sterile Webril, and a  splint was applied. The patient tolerated the procedure well and was taken to the recovery  room in stable condition. Sravan Chen CNP was 1st Assist given the nature of the procedure that needed advanced assistance. POSTOPERATIVE PLAN:  The patient will be discharged home. He will be  nonweightbearing for at least six to eight weeks. We can start range of  motion in two weeks.         Cynthia Villalobos MD    D: 12/03/2021 12:39:24       T: 12/04/2021 0:16:29     [email or web address]  Job#: [id number]     Doc#: [id number]    CC:  Derick Nguyen Do

## 2021-12-10 DIAGNOSIS — S82.851A CLOSED TRIMALLEOLAR FRACTURE OF RIGHT ANKLE, INITIAL ENCOUNTER: Primary | ICD-10-CM

## 2021-12-10 RX ORDER — HYDROCODONE BITARTRATE AND ACETAMINOPHEN 5; 325 MG/1; MG/1
1 TABLET ORAL EVERY 8 HOURS PRN
Qty: 15 TABLET | Refills: 0 | Status: SHIPPED | OUTPATIENT
Start: 2021-12-11 | End: 2021-12-16

## 2021-12-10 NOTE — TELEPHONE ENCOUNTER
Prescription Refill     Medication Name:  HYDROCODONE  Pharmacy: [facility]   Address: [address] [place] [address], [address] [place] [address],[address]  Phone: [phone]  Patient Contact Number:  [phone]

## 2021-12-15 ENCOUNTER — OFFICE VISIT (OUTPATIENT)
Dept: ORTHOPEDIC SURGERY | Age: 46
End: 2021-12-15
Payer: COMMERCIAL

## 2021-12-15 VITALS — HEIGHT: 71 IN | BODY MASS INDEX: 27.89 KG/M2

## 2021-12-15 DIAGNOSIS — S82.871A CLOSED DISPLACED PILON FRACTURE OF RIGHT TIBIA, INITIAL ENCOUNTER: Primary | ICD-10-CM

## 2021-12-15 DIAGNOSIS — S82.61XA CLOSED FRACTURE OF PROXIMAL LATERAL MALLEOLUS OF RIGHT FIBULA, INITIAL ENCOUNTER: ICD-10-CM

## 2021-12-15 PROCEDURE — L4360 PNEUMAT WALKING BOOT PRE CST: HCPCS | Performed by: NURSE PRACTITIONER

## 2021-12-15 PROCEDURE — APPNB15 APP NON BILLABLE TIME 0-15 MINS: Performed by: NURSE PRACTITIONER

## 2021-12-15 PROCEDURE — 99024 POSTOP FOLLOW-UP VISIT: CPT | Performed by: NURSE PRACTITIONER

## 2021-12-16 RX ORDER — TRAMADOL HYDROCHLORIDE 50 MG/1
50 TABLET ORAL EVERY 8 HOURS PRN
Qty: 15 TABLET | Refills: 0 | Status: SHIPPED | OUTPATIENT
Start: 2021-12-16 | End: 2021-12-21

## 2021-12-16 NOTE — TELEPHONE ENCOUNTER
Prescription Refill     Medication Name:  HYDROCODONE-ACETAMINOPHEN 5-325 MG  [facility]: [facility] [address]  Patient Contact Number:  [phone]    PATIENT CALL AND STATED HE WAS SEEN YESTERDAY AND HE WAS ADVISED ON GET THE MEDICATION FOR PAIN.

## 2021-12-16 NOTE — TELEPHONE ENCOUNTER
Called and notified patient that the prescription had been signed and pharmacy should call him soon to let him know its ready

## 2021-12-17 NOTE — PROGRESS NOTES
disease, cancer, and other health problems including poor bone and wound healing. The importance of smoking cessation for optimal bone and wound healing was stressed. This was communicated verbally, 5 Minutes. Procedures    Breg Tall Luisa Walking Boot     Patient was prescribed a Breg Tall Luisa Walking Boot. The right ankle will require stabilization / immobilization from this semi-rigid / rigid orthosis to improve their function. The orthosis will assist in protecting the affected area, provide functional support and facilitate healing. Patient was instructed to progress ambulation  as non weight bearing in the device. The plan of care is to progress the patient to full weight bearing status. The patient was educated and fit by a healthcare professional with expert knowledge and specialization in brace application while under the direct supervision of the physician. Verbal and written instructions for the use of and application of this item were provided. They were instructed to contact the office immediately should the brace result in increased pain, decreased sensation, increased swelling or worsening of the condition.      Arley Vernon, RU - CNP

## 2021-12-21 DIAGNOSIS — S82.871A CLOSED DISPLACED PILON FRACTURE OF RIGHT TIBIA, INITIAL ENCOUNTER: ICD-10-CM

## 2021-12-21 DIAGNOSIS — S82.61XA CLOSED FRACTURE OF PROXIMAL LATERAL MALLEOLUS OF RIGHT FIBULA, INITIAL ENCOUNTER: ICD-10-CM

## 2021-12-21 DIAGNOSIS — S82.851A CLOSED TRIMALLEOLAR FRACTURE OF RIGHT ANKLE, INITIAL ENCOUNTER: Primary | ICD-10-CM

## 2021-12-21 RX ORDER — TRAMADOL HYDROCHLORIDE 50 MG/1
50 TABLET ORAL EVERY 8 HOURS PRN
Qty: 15 TABLET | Refills: 0 | Status: SHIPPED | OUTPATIENT
Start: 2021-12-21 | End: 2021-12-28 | Stop reason: SDUPTHER

## 2021-12-21 NOTE — TELEPHONE ENCOUNTER
Called and spoke to patient. Patient is having pain from ankle to hip that started a couple days ago. The pain has been worse at night but has improved. The pain has been coming and going. Patient denies any tenderness, swelling, discoloration, and warmth. Notified patient that is pain is most likely normal post op pain. Patient understood. Patient also asked about a refill on pain medication. Notified Id get the order ready, but may take a bit for SMA to sign. Pharmacy should call patient when ready.

## 2021-12-21 NOTE — TELEPHONE ENCOUNTER
The patient is having a lot pain that is coming from his ankle up to his hip and is wondering if that is normal.  It is going up the front part of his leg(bone). Please call.

## 2021-12-28 DIAGNOSIS — S82.61XA CLOSED FRACTURE OF PROXIMAL LATERAL MALLEOLUS OF RIGHT FIBULA, INITIAL ENCOUNTER: ICD-10-CM

## 2021-12-28 DIAGNOSIS — S82.871A CLOSED DISPLACED PILON FRACTURE OF RIGHT TIBIA, INITIAL ENCOUNTER: ICD-10-CM

## 2021-12-28 RX ORDER — TRAMADOL HYDROCHLORIDE 50 MG/1
50 TABLET ORAL EVERY 8 HOURS PRN
Qty: 9 TABLET | Refills: 0 | Status: SHIPPED | OUTPATIENT
Start: 2021-12-28 | End: 2021-12-31

## 2021-12-28 NOTE — TELEPHONE ENCOUNTER
Prescription Refill     Medication Name:  TRAMADOL   Pharmacy: [address] [place] [address]   Patient Contact Number:  [phone]

## 2022-01-17 ENCOUNTER — HOSPITAL ENCOUNTER (EMERGENCY)
Age: 47
Discharge: LEFT BEFORE DISCHARGE | End: 2022-01-17
Payer: COMMERCIAL

## 2022-01-17 ENCOUNTER — APPOINTMENT (OUTPATIENT)
Dept: GENERAL RADIOLOGY | Age: 47
End: 2022-01-17
Payer: COMMERCIAL

## 2022-01-17 VITALS
OXYGEN SATURATION: 100 % | HEART RATE: 96 BPM | TEMPERATURE: 98.2 F | SYSTOLIC BLOOD PRESSURE: 161 MMHG | RESPIRATION RATE: 18 BRPM | DIASTOLIC BLOOD PRESSURE: 114 MMHG

## 2022-01-17 PROCEDURE — 73610 X-RAY EXAM OF ANKLE: CPT

## 2022-01-17 PROCEDURE — 4500000002 HC ER NO CHARGE

## 2022-01-17 ASSESSMENT — PAIN DESCRIPTION - FREQUENCY: FREQUENCY: CONTINUOUS

## 2022-01-17 ASSESSMENT — PAIN DESCRIPTION - LOCATION: LOCATION: ANKLE

## 2022-01-17 ASSESSMENT — PAIN DESCRIPTION - PAIN TYPE: TYPE: ACUTE PAIN

## 2022-01-17 ASSESSMENT — PAIN DESCRIPTION - DESCRIPTORS: DESCRIPTORS: ACHING;CONSTANT

## 2022-01-17 ASSESSMENT — PAIN SCALES - GENERAL: PAINLEVEL_OUTOF10: 10

## 2022-01-17 ASSESSMENT — PAIN SCALES - WONG BAKER: WONGBAKER_NUMERICALRESPONSE: 10

## 2022-01-17 ASSESSMENT — PAIN DESCRIPTION - ORIENTATION: ORIENTATION: RIGHT

## 2022-01-19 ENCOUNTER — OFFICE VISIT (OUTPATIENT)
Dept: ORTHOPEDIC SURGERY | Age: 47
End: 2022-01-19
Payer: COMMERCIAL

## 2022-01-19 VITALS — BODY MASS INDEX: 28 KG/M2 | HEIGHT: 71 IN | WEIGHT: 200 LBS

## 2022-01-19 DIAGNOSIS — S82.871A CLOSED DISPLACED PILON FRACTURE OF RIGHT TIBIA, INITIAL ENCOUNTER: Primary | ICD-10-CM

## 2022-01-19 DIAGNOSIS — S82.61XA CLOSED FRACTURE OF PROXIMAL LATERAL MALLEOLUS OF RIGHT FIBULA, INITIAL ENCOUNTER: ICD-10-CM

## 2022-01-19 DIAGNOSIS — B99.9 INFECTION: ICD-10-CM

## 2022-01-19 PROCEDURE — 99406 BEHAV CHNG SMOKING 3-10 MIN: CPT | Performed by: NURSE PRACTITIONER

## 2022-01-19 PROCEDURE — 99024 POSTOP FOLLOW-UP VISIT: CPT | Performed by: NURSE PRACTITIONER

## 2022-01-19 PROCEDURE — APPNB30 APP NON BILLABLE TIME 0-30 MINS: Performed by: NURSE PRACTITIONER

## 2022-01-19 RX ORDER — CLINDAMYCIN HYDROCHLORIDE 300 MG/1
300 CAPSULE ORAL 4 TIMES DAILY
Qty: 40 CAPSULE | Refills: 0 | Status: SHIPPED | OUTPATIENT
Start: 2022-01-19 | End: 2022-01-29

## 2022-01-20 NOTE — PROGRESS NOTES
DIAGNOSIS:    1-Right ankle distal tibia medial pilon intraarticular fracture, status post ORIF. 2-Right ankle lateral malleolus displace fracture, s/p ORIF    DATE OF SURGERY:  12/2/2021. HISTORY OF PRESENT ILLNESS:  Mr. Umu García 48 y.o.  male who came in today for 10 weeks postoperative visit. The patient denies any significant pain in the right ankle. Rates pain a 10/10 VAS moderate, aching, throbbing, intermittent and worsening. Aggravating factors movement. Alleviating factors elevation and rest. He states he noticed pus coming from the incisions 1 week ago and is worsening. He has been in a boot, and non WB. No numbness or tingling sensation. No fever or Chills. He is a smoker. He works at [employer], standing job. PHYSICAL EXAMINATION:  The incision medially is draining thick white tan pus and the lateral incision is not completely healed. There is mild erythema and pus drainage, minimal swelling. He has mild pain with the active or passive range of motion of the right ankle, but decrease ROM. He has intact sensation distally, and he is neurovascularly intact. IMAGING:  Three views right ankle taken today in the office showed anatomic alignment of the fracture, plates and screws in good position, no loosening. Ankle mortise is well centered. IMPRESSION:  6 weeks out from   1-Right ankle distal tibia medial pilon intraarticular fracture, status post ORIF. 2-Right ankle lateral malleolus displace fracture, s/p ORIF  3-Right ankle infection    PLAN: He will continue in a boot, and non WB 6-8 weeks. I have told the patient to work on ROM. Rest and elevate above heart level. ASA for DVT prophylaxis. Will start him on Clindamycin, rx sent and instructed in care. We discussed the seriousness of the infection, as well as smoking. He verbalized understanding. The patient will come back for a follow up in 1 week for wound check.  He is aware that he made need surgical I&D.    The patient smokes, and we discussed with the patient the risks of smoking on general health and also on bone and soft tissue healing (delay and non-union), and promised to cut down or stop smoking. Smoking: Educated the patient regarding the hazards of smoking and that it harms their body in many ways. It increases the chance of developing heart disease, lung disease, cancer, and other health problems including poor bone and wound healing. The importance of smoking cessation for optimal bone and wound healing was stressed. This was communicated verbally, 5 Minutes.         RU Manuel - CNP

## 2022-02-07 ENCOUNTER — TELEPHONE (OUTPATIENT)
Dept: INTERNAL MEDICINE CLINIC | Age: 47
End: 2022-02-07

## 2022-02-07 ENCOUNTER — HOSPITAL ENCOUNTER (EMERGENCY)
Age: 47
Discharge: HOME | End: 2022-02-07
Attending: EMERGENCY MEDICINE
Payer: COMMERCIAL

## 2022-02-07 VITALS
HEART RATE: 91 BPM | DIASTOLIC BLOOD PRESSURE: 84 MMHG | SYSTOLIC BLOOD PRESSURE: 142 MMHG | WEIGHT: 214.29 LBS | TEMPERATURE: 98.1 F | HEIGHT: 71 IN | BODY MASS INDEX: 30 KG/M2 | OXYGEN SATURATION: 99 % | RESPIRATION RATE: 16 BRPM

## 2022-02-07 DIAGNOSIS — Z71.1 CONCERN ABOUT STD IN MALE WITHOUT DIAGNOSIS: Primary | ICD-10-CM

## 2022-02-07 DIAGNOSIS — R03.0 ELEVATED BLOOD PRESSURE READING: ICD-10-CM

## 2022-02-07 PROCEDURE — 99282 EMERGENCY DEPT VISIT SF MDM: CPT

## 2022-02-07 ASSESSMENT — PAIN SCALES - GENERAL: PAINLEVEL_OUTOF10: 0
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
## 2022-02-07 NOTE — TELEPHONE ENCOUNTER
Patient requesting lab results. Please call [phone]            - Dr. Shawn Talley, DO PGY3 -     Attempted to call patient 3 times and no response. Left VM for him to call back for records as able.       - DM

## 2023-10-25 NOTE — LETTER
[address] [facility]  Surgery Scheduling Form:      DEMOGRAPHICS:                                                                                                              .    Patient Name:  Kojo Rendon  Patient :  1975   Patient SS#:      Patient Phone:  [phone] (home)  Alt. Patient Phone:    Patient Address:  [address]  [address] [address]    PCP:  Ruy Carlin DO    Payor/Plan Subscr  Sex Relation Sub. Ins. ID Effective Group Num   1. [employer] - * CHAITANYA MCCALLUM 1975 Male Self [id number] 1/1/15 [facility] [address]       DIAGNOSIS & PROCEDURE:                                                                                            .    Diagnosis:  Right trimalleolar fracture S82.851A  Operation:  Open reduction internal fixation right ankle fracture [address]  Location:  [facility]  Surgeon:  Maninder Duffy MD    SCHEDULING INFORMATION:                                                                                         .    Surgeon's Scheduling Instruction:  thursday  Requested Date:  21  OR Time:  9:30 Patient Arrival Time:  7:30  OR Time Required:  60  Minutes  Anesthesia:  General    SA Required:  Yes  Equipment:  yenifer  Mini C-Arm:  No    Standard C-Arm:  Yes  Status:  Outpatient    PAT Required:  Yes  Latex Allergy:  unknown    Defibrilator or Pacemaker:  unknown  Isolation Precautions:  unknown  Comments: LEVON Carpenter MD 21   BILLING INFORMATION:                                                                                                    .    Procedure:       CPT Code Modifier                    Pre Operative Physician Prophylaxis Orders - SCIP Protocols      Patient:  Kojo Rendon  :    1975    Procedure: 21 Open reduction internal fixation ankle    COVID: RAPID  HEIGHT:  Ht Readings from Last 1 Encounters:   21 5' 11\" (1.803 m) Detail Level: Simple Detail Level: Detailed WEIGHT:        History & Physical:  [ X] By Surgeon  [ ]By PCP  [ Jyoti Burton Report     Pre-Operative Antibiotic Order:     []  ----  No Antibiotic Ordered       [x]  ----  Give the following Antibiotic within 1 hour prior to start time:                                                      Cefazolin 2g IV <119.9kg (264lbs) OR 3g if >120kg (453IJJ)       or      Clindamycin 900 mg IV (over 1 hour) if patient is allergic to           PENICILLINS or CAPHALOSPORIN       VTE prophylaxis [ ] Thigh hi  TEDS  [ ]  Knee Hi TEDS [ ] Foot Pumps [ ] Compression Devices      Physician Signature:     Date: 12/1/21  Time: 10:18 AM

## (undated) DEVICE — BIT DRL DIA3.5MM FOR SM FRAG UNIV LOK SYS

## (undated) DEVICE — SUTURE MCRYL + SZ 4-0 L27IN ABSRB UD L19MM PS-2 3/8 CIR MCP426H

## (undated) DEVICE — INTENDED FOR TISSUE SEPARATION, AND OTHER PROCEDURES THAT REQUIRE A SHARP SURGICAL BLADE TO PUNCTURE OR CUT.: Brand: BARD-PARKER ® STAINLESS STEEL BLADES

## (undated) DEVICE — MASC TURNOVER KIT: Brand: MEDLINE INDUSTRIES, INC.

## (undated) DEVICE — GLOVE SURG SZ 65 THK91MIL LTX FREE SYN POLYISOPRENE

## (undated) DEVICE — APPLICATOR MEDICATED 26 CC SOLUTION HI LT ORNG CHLORAPREP

## (undated) DEVICE — MASTISOL ADHESIVE LIQ 2/3ML

## (undated) DEVICE — SPONGE LAP W18XL18IN WHT COT 4 PLY FLD STRUNG RADPQ DISP ST

## (undated) DEVICE — DRAPE,U/ SHT,SPLIT,PLAS,STERIL: Brand: MEDLINE

## (undated) DEVICE — GLOVE ORANGE PI 8   MSG9080

## (undated) DEVICE — BANDAGE COMPR W6INXL10YD ST M E WHITE/BEIGE

## (undated) DEVICE — C-ARM: Brand: UNBRANDED

## (undated) DEVICE — BIT DRL DIA2MM STD QUIK CONN FOR PERIARTC LOK PLT SYS

## (undated) DEVICE — PADDING CAST W6INXL4YD ST COT RAYON MICROPLEATED HIGHLY

## (undated) DEVICE — GAUZE,SPONGE,4"X4",8PLY,STRL,LF,10/TRAY: Brand: MEDLINE

## (undated) DEVICE — BIT DRL L100MM DIA2.5MM FOR SM FRAG UNIV LOK SYS

## (undated) DEVICE — PADDING CAST W4INXL4YD ST COT RAYON MICROPLEATED HIGHLY

## (undated) DEVICE — GLOVE SURG SZ 65 L12IN THK75MIL DK GRN LTX FREE

## (undated) DEVICE — SOLUTION IRRIG 500ML 0.9% SOD CHL USP POUR PLAS BTL

## (undated) DEVICE — STRIP,CLOSURE,WOUND,MEDI-STRIP,1/2X4: Brand: MEDLINE

## (undated) DEVICE — ZIMMER® STERILE DISPOSABLE TOURNIQUET CUFF WITH PLC, DUAL PORT, SINGLE BLADDER, 34 IN. (86 CM)

## (undated) DEVICE — TOWEL,OR,DSP,ST,BLUE,STD,4/PK,20PK/CS: Brand: MEDLINE

## (undated) DEVICE — PACK PROCEDURE SURG EXTREMITY MFFOP CUST

## (undated) DEVICE — SUTURE VCRL + SZ 3-0 L18IN ABSRB UD SH 1/2 CIR TAPERCUT NDL VCP864D

## (undated) DEVICE — SYRINGE IRRIG 60ML SFT PLIABLE BLB EZ TO GRP 1 HND USE W/

## (undated) DEVICE — Z DISCONTINUED USE 2272117 DRAPE SURG 3 QTR N INVASIVE 2 LAYR DISP

## (undated) DEVICE — HYPODERMIC SAFETY NEEDLE: Brand: MAGELLAN

## (undated) DEVICE — SUTURE VCRL + SZ 2-0 L18IN ABSRB UD CT1 L36MM 1/2 CIR VCP839D

## (undated) DEVICE — GLOVE ORTHO 8   MSG9480

## (undated) DEVICE — DRESSING,GAUZE,XEROFORM,CURAD,1"X8",ST: Brand: CURAD

## (undated) DEVICE — T-DRAPE,EXTREMITY,STERILE: Brand: MEDLINE

## (undated) DEVICE — BANDAGE,ELASTIC,ESMARK,STERILE,6"X9',LF: Brand: MEDLINE

## (undated) DEVICE — MEDICINE CUP, GRADUATED, STER: Brand: MEDLINE